# Patient Record
Sex: MALE | Race: WHITE | NOT HISPANIC OR LATINO
[De-identification: names, ages, dates, MRNs, and addresses within clinical notes are randomized per-mention and may not be internally consistent; named-entity substitution may affect disease eponyms.]

---

## 2017-01-01 ENCOUNTER — RECORDS - HEALTHEAST (OUTPATIENT)
Dept: ADMINISTRATIVE | Facility: OTHER | Age: 67
End: 2017-01-01

## 2017-03-09 ENCOUNTER — RECORDS - HEALTHEAST (OUTPATIENT)
Dept: ADMINISTRATIVE | Facility: OTHER | Age: 67
End: 2017-03-09

## 2017-07-19 ENCOUNTER — RECORDS - HEALTHEAST (OUTPATIENT)
Dept: ADMINISTRATIVE | Facility: OTHER | Age: 67
End: 2017-07-19

## 2018-01-01 ENCOUNTER — RECORDS - HEALTHEAST (OUTPATIENT)
Dept: ADMINISTRATIVE | Facility: OTHER | Age: 68
End: 2018-01-01

## 2018-01-01 ENCOUNTER — HOSPITAL ENCOUNTER (INPATIENT)
Dept: MEDSURG UNIT | Facility: CLINIC | Age: 68
End: 2018-08-07
Attending: INTERNAL MEDICINE | Admitting: INTERNAL MEDICINE

## 2018-01-01 LAB
AEROBIC BLOOD CULTURE BOTTLE: ABNORMAL
ALBUMIN SERPL-MCNC: 1.3 G/DL (ref 3.5–5)
ALBUMIN SERPL-MCNC: 1.4 G/DL (ref 3.5–5)
ALBUMIN UR-MCNC: ABNORMAL MG/DL
ALP SERPL-CCNC: 231 U/L (ref 45–120)
ALP SERPL-CCNC: 381 U/L (ref 45–120)
ALT SERPL W P-5'-P-CCNC: 36 U/L (ref 0–45)
ALT SERPL W P-5'-P-CCNC: 50 U/L (ref 0–45)
AMMONIA PLAS-SCNC: 60 UMOL/L (ref 11–35)
ANAEROBIC BLOOD CULTURE BOTTLE: ABNORMAL
ANION GAP SERPL CALCULATED.3IONS-SCNC: 24 MMOL/L (ref 5–18)
ANION GAP SERPL CALCULATED.3IONS-SCNC: 25 MMOL/L (ref 5–18)
ANION GAP SERPL CALCULATED.3IONS-SCNC: >31 MMOL/L (ref 5–18)
APPEARANCE UR: ABNORMAL
AST SERPL W P-5'-P-CCNC: 33 U/L (ref 0–40)
AST SERPL W P-5'-P-CCNC: 42 U/L (ref 0–40)
ATRIAL RATE - MUSE: 74 BPM
BACTERIA #/AREA URNS HPF: ABNORMAL HPF
BASE EXCESS BLDA CALC-SCNC: -20.3 MMOL/L
BASE EXCESS BLDA CALC-SCNC: -24.2 MMOL/L
BASOPHILS # BLD AUTO: 0 THOU/UL (ref 0–0.2)
BASOPHILS # BLD AUTO: 0 THOU/UL (ref 0–0.2)
BASOPHILS NFR BLD AUTO: 0 % (ref 0–2)
BASOPHILS NFR BLD AUTO: 0 % (ref 0–2)
BILIRUB DIRECT SERPL-MCNC: 0.1 MG/DL
BILIRUB DIRECT SERPL-MCNC: 0.2 MG/DL
BILIRUB SERPL-MCNC: 0.3 MG/DL (ref 0–1)
BILIRUB SERPL-MCNC: 0.3 MG/DL (ref 0–1)
BILIRUB UR QL STRIP: NEGATIVE
BUN SERPL-MCNC: 102 MG/DL (ref 8–22)
BUN SERPL-MCNC: 97 MG/DL (ref 8–22)
BUN SERPL-MCNC: 99 MG/DL (ref 8–22)
CALCIUM SERPL-MCNC: 7.9 MG/DL (ref 8.5–10.5)
CALCIUM SERPL-MCNC: 8.2 MG/DL (ref 8.5–10.5)
CALCIUM SERPL-MCNC: 9.7 MG/DL (ref 8.5–10.5)
CHLORIDE BLD-SCNC: 92 MMOL/L (ref 98–107)
CHLORIDE BLD-SCNC: 97 MMOL/L (ref 98–107)
CHLORIDE BLD-SCNC: 98 MMOL/L (ref 98–107)
CK SERPL-CCNC: 47 U/L (ref 30–190)
CO2 SERPL-SCNC: 7 MMOL/L (ref 22–31)
CO2 SERPL-SCNC: 8 MMOL/L (ref 22–31)
CO2 SERPL-SCNC: <6 MMOL/L (ref 22–31)
COHGB MFR BLD: 100 % (ref 95–96)
COHGB MFR BLD: 93.5 % (ref 95–96)
COLOR UR AUTO: YELLOW
CREAT SERPL-MCNC: 3.81 MG/DL (ref 0.7–1.3)
CREAT SERPL-MCNC: 3.85 MG/DL (ref 0.7–1.3)
CREAT SERPL-MCNC: 3.9 MG/DL (ref 0.7–1.3)
DIASTOLIC BLOOD PRESSURE - MUSE: NORMAL MMHG
EOSINOPHIL COUNT (ABSOLUTE): 0 THOU/UL (ref 0–0.4)
EOSINOPHIL COUNT (ABSOLUTE): 0.4 THOU/UL (ref 0–0.4)
EOSINOPHIL NFR BLD AUTO: 0 % (ref 0–6)
EOSINOPHIL NFR BLD AUTO: 1 % (ref 0–6)
ERYTHROCYTE [DISTWIDTH] IN BLOOD BY AUTOMATED COUNT: 18.5 % (ref 11–14.5)
ERYTHROCYTE [DISTWIDTH] IN BLOOD BY AUTOMATED COUNT: 18.5 % (ref 11–14.5)
ERYTHROCYTE [DISTWIDTH] IN BLOOD BY AUTOMATED COUNT: 18.6 % (ref 11–14.5)
FLOW: 3.5 LPM
FLOW: 3.5 LPM
GFR SERPL CREATININE-BSD FRML MDRD: 16 ML/MIN/1.73M2
GLUCOSE BLD-MCNC: 128 MG/DL (ref 70–125)
GLUCOSE BLD-MCNC: 159 MG/DL (ref 70–125)
GLUCOSE BLD-MCNC: 194 MG/DL (ref 70–125)
GLUCOSE BLDC GLUCOMTR-MCNC: 122 MG/DL
GLUCOSE BLDC GLUCOMTR-MCNC: 173 MG/DL
GLUCOSE BLDC GLUCOMTR-MCNC: 197 MG/DL
GLUCOSE UR STRIP-MCNC: NEGATIVE MG/DL
HCO3, ARTERIAL CALC - HISTORICAL: 6.1 MMOL/L (ref 23–29)
HCO3, ARTERIAL CALC - HISTORICAL: 9 MMOL/L (ref 23–29)
HCT VFR BLD AUTO: 31.2 % (ref 40–54)
HCT VFR BLD AUTO: 36.4 % (ref 40–54)
HCT VFR BLD AUTO: 36.4 % (ref 40–54)
HGB BLD-MCNC: 10.6 G/DL (ref 14–18)
HGB BLD-MCNC: 11.9 G/DL (ref 14–18)
HGB BLD-MCNC: 11.9 G/DL (ref 14–18)
HGB UR QL STRIP: ABNORMAL
INR PPP: 2.43 (ref 0.9–1.1)
INR PPP: 2.58 (ref 0.9–1.1)
INTERPRETATION ECG - MUSE: NORMAL
KETONES UR STRIP-MCNC: NEGATIVE MG/DL
LACTATE SERPL-SCNC: 10.2 MMOL/L (ref 0.5–2.2)
LACTATE SERPL-SCNC: 15.2 MMOL/L (ref 0.5–2.2)
LACTATE SERPL-SCNC: 8.1 MMOL/L (ref 0.5–2.2)
LACTATE SERPL-SCNC: 8.3 MMOL/L (ref 0.5–2.2)
LEUKOCYTE ESTERASE UR QL STRIP: ABNORMAL
LIPASE SERPL-CCNC: 19 U/L (ref 0–52)
LYMPHOCYTES # BLD AUTO: 0.8 THOU/UL (ref 0.8–4.4)
LYMPHOCYTES # BLD AUTO: 1.7 THOU/UL (ref 0.8–4.4)
LYMPHOCYTES NFR BLD AUTO: 3 % (ref 20–40)
LYMPHOCYTES NFR BLD AUTO: 4 % (ref 20–40)
MAGNESIUM SERPL-MCNC: 2.8 MG/DL (ref 1.8–2.6)
MCH RBC QN AUTO: 30.7 PG (ref 27–34)
MCH RBC QN AUTO: 30.7 PG (ref 27–34)
MCH RBC QN AUTO: 31 PG (ref 27–34)
MCHC RBC AUTO-ENTMCNC: 32.7 G/DL (ref 32–36)
MCHC RBC AUTO-ENTMCNC: 32.7 G/DL (ref 32–36)
MCHC RBC AUTO-ENTMCNC: 34 G/DL (ref 32–36)
MCV RBC AUTO: 91 FL (ref 80–100)
MCV RBC AUTO: 94 FL (ref 80–100)
MCV RBC AUTO: 94 FL (ref 80–100)
MONOCYTES # BLD AUTO: 0.8 THOU/UL (ref 0–0.9)
MONOCYTES # BLD AUTO: 0.9 THOU/UL (ref 0–0.9)
MONOCYTES NFR BLD AUTO: 2 % (ref 2–10)
MONOCYTES NFR BLD AUTO: 3 % (ref 2–10)
MYELOCYTES (ABSOLUTE): 0.3 THOU/UL
MYELOCYTES (ABSOLUTE): 0.9 THOU/UL
MYELOCYTES NFR BLD MANUAL: 1 %
MYELOCYTES NFR BLD MANUAL: 2 %
NITRATE UR QL: NEGATIVE
OXYHEMOGLOBIN - HISTORICAL: 90.6 % (ref 95–96)
OXYHEMOGLOBIN - HISTORICAL: 96.7 % (ref 95–96)
P AXIS - MUSE: 64 DEGREES
PCO2 BLD: 24 MM HG (ref 35–45)
PCO2 BLD: <19 MM HG (ref 35–45)
PH BLD: 7.03 [PH] (ref 7.37–7.44)
PH BLD: 7.11 [PH] (ref 7.37–7.44)
PH UR STRIP: 6 [PH] (ref 4.5–8)
PLAT MORPH BLD: NORMAL
PLAT MORPH BLD: NORMAL
PLATELET # BLD AUTO: 223 THOU/UL (ref 140–440)
PLATELET # BLD AUTO: 312 THOU/UL (ref 140–440)
PLATELET # BLD AUTO: 312 THOU/UL (ref 140–440)
PMV BLD AUTO: 11.4 FL (ref 8.5–12.5)
PMV BLD AUTO: 11.4 FL (ref 8.5–12.5)
PMV BLD AUTO: 11.5 FL (ref 8.5–12.5)
PO2 BLD: 123 MM HG (ref 75–85)
PO2 BLD: 61 MM HG (ref 75–85)
POTASSIUM BLD-SCNC: 5.5 MMOL/L (ref 3.5–5)
POTASSIUM BLD-SCNC: 5.6 MMOL/L (ref 3.5–5)
POTASSIUM BLD-SCNC: 5.6 MMOL/L (ref 3.5–5)
PR INTERVAL - MUSE: 196 MS
PROT SERPL-MCNC: 5.3 G/DL (ref 6–8)
PROT SERPL-MCNC: 6.4 G/DL (ref 6–8)
QRS DURATION - MUSE: 102 MS
QT - MUSE: 378 MS
QTC - MUSE: 419 MS
R AXIS - MUSE: 57 DEGREES
RBC # BLD AUTO: 3.42 MILL/UL (ref 4.4–6.2)
RBC # BLD AUTO: 3.87 MILL/UL (ref 4.4–6.2)
RBC # BLD AUTO: 3.87 MILL/UL (ref 4.4–6.2)
RBC #/AREA URNS AUTO: ABNORMAL HPF
SODIUM SERPL-SCNC: 129 MMOL/L (ref 136–145)
SODIUM SERPL-SCNC: 129 MMOL/L (ref 136–145)
SODIUM SERPL-SCNC: 130 MMOL/L (ref 136–145)
SP GR UR STRIP: 1.02 (ref 1–1.03)
SQUAMOUS #/AREA URNS AUTO: ABNORMAL LPF
SYSTOLIC BLOOD PRESSURE - MUSE: NORMAL MMHG
T AXIS - MUSE: 77 DEGREES
TEMPERATURE: 37 DEGREES C
TEMPERATURE: 37 DEGREES C
TOTAL NEUTROPHILS-ABS(DIFF): 25.4 THOU/UL (ref 2–7.7)
TOTAL NEUTROPHILS-ABS(DIFF): 39.1 THOU/UL (ref 2–7.7)
TOTAL NEUTROPHILS-REL(DIFF): 91 % (ref 50–70)
TOTAL NEUTROPHILS-REL(DIFF): 93 % (ref 50–70)
TOXIC GRANULATION: ABNORMAL
TROPONIN I SERPL-MCNC: 0.02 NG/ML (ref 0–0.29)
UFH PPP CHRO-ACNC: <0.1 IU/ML (ref 0.3–0.7)
UFH PPP CHRO-ACNC: <0.1 IU/ML (ref 0.3–0.7)
UROBILINOGEN UR STRIP-ACNC: ABNORMAL
VENTILATION MODE: ABNORMAL
VENTILATION MODE: ABNORMAL
VENTRICULAR RATE- MUSE: 74 BPM
WBC #/AREA URNS AUTO: >100 HPF
WBC CLUMPS #/AREA URNS HPF: PRESENT /[HPF]
WBC: 27.3 THOU/UL (ref 4–11)
WBC: 43 THOU/UL (ref 4–11)
WBC: 43 THOU/UL (ref 4–11)

## 2018-01-01 RX ORDER — FERROUS SULFATE 325(65) MG
1 TABLET ORAL
Status: SHIPPED | COMMUNITY
Start: 2018-01-01

## 2018-01-01 RX ORDER — AMLODIPINE BESYLATE 10 MG/1
10 TABLET ORAL DAILY
Status: SHIPPED | COMMUNITY
Start: 2018-01-01

## 2018-01-01 RX ORDER — VENLAFAXINE HYDROCHLORIDE 75 MG/1
75 CAPSULE, EXTENDED RELEASE ORAL DAILY
Status: SHIPPED | COMMUNITY
Start: 2018-01-01

## 2018-01-01 ASSESSMENT — MIFFLIN-ST. JEOR
SCORE: 1572.31
SCORE: 1617.67

## 2018-08-08 LAB
AEROBIC BLOOD CULTURE BOTTLE: ABNORMAL
ANAEROBIC BLOOD CULTURE BOTTLE: ABNORMAL
BACTERIA SPEC CULT: NORMAL

## 2018-08-10 LAB
BACTERIA SPEC CULT: ABNORMAL
GRAM STAIN RESULT: ABNORMAL

## 2018-08-30 ENCOUNTER — RECORDS - HEALTHEAST (OUTPATIENT)
Dept: ADMINISTRATIVE | Facility: OTHER | Age: 68
End: 2018-08-30

## 2021-06-01 VITALS — BODY MASS INDEX: 32.24 KG/M2 | HEIGHT: 66 IN | WEIGHT: 200.6 LBS

## 2021-06-16 PROBLEM — R65.21 SEPTIC SHOCK (H): Status: ACTIVE | Noted: 2018-01-01

## 2021-06-16 PROBLEM — G93.41 ACUTE METABOLIC ENCEPHALOPATHY: Status: ACTIVE | Noted: 2018-01-01

## 2021-06-16 PROBLEM — A41.9 SEPTIC SHOCK (H): Status: ACTIVE | Noted: 2018-01-01

## 2021-06-16 PROBLEM — N17.9 ACUTE RENAL FAILURE (ARF) (H): Status: ACTIVE | Noted: 2018-01-01

## 2021-06-16 PROBLEM — E87.29 HIGH ANION GAP METABOLIC ACIDOSIS: Status: ACTIVE | Noted: 2018-01-01

## 2021-06-16 PROBLEM — E87.20 LACTIC ACIDOSIS: Status: ACTIVE | Noted: 2018-01-01

## 2021-06-16 PROBLEM — A41.9 SEPSIS (H): Status: ACTIVE | Noted: 2018-01-01

## 2021-06-16 PROBLEM — R78.81 BACTEREMIA: Status: ACTIVE | Noted: 2018-01-01

## 2021-06-19 NOTE — PROGRESS NOTES
Palliative Care    Patient placed on comfort care prior to our consult.  Discussed with Dr. Arzola and Dr. Delacruz.  I added comfort care meds.  Discussed with Dr. Arzola, patient was diverted to [facility].  Wife will not be coming in.  Patient is anticipated to die here. Palliative Care to follow and if patient plateaus, then I will refer to Hospice.  Discussed with Bianca Claudio, RNCM, per Dr Arzola, wife is concerned about Cremation cost or any costs that my occur that would not have occurred at the [facility].  Bianca to call wife.     Full now to follow    Salud Looney  Palliative Care

## 2021-06-19 NOTE — PROGRESS NOTES
"Spiritual Care Note    Spiritual Assessment:  referred to patient via spiritual care consult; patient now on comfort care. Patient resting when  arrives. Patient seems to be struggling when  arrives; no family is present. When asked about what brought patient into the hospital he states, \"I got lost.\" When asked about support at this time patient simply continued to repeat and address.  unsure who's address patient was quoting and  did tell . Patient states appreciation for visit and did say goodbye when the  left.  hopeful patient does have family for support.     Care Provided:   offered words of comfort and support.     Plan of Care: Spiritual care will continue to follow as part of patient's care team.     AGUSTÍN Huerta, BCC    "

## 2021-06-19 NOTE — DISCHARGE SUMMARY
[facility] MEDICINE  DISCHARGE SUMMARY     Primary Care Physician: No Primary Care Provider  Admission Date: 2018   Discharge Provider: Josefa Arzola Discharge Date: 2018             Condition at Discharge:       REASON FOR PRESENTATION(See Admission Note for Details)     Altered mental status    PRINCIPAL & ACTIVE DISCHARGE DIAGNOSES     Principal Problem:    Septic shock (H)  Active Problems:    Acute renal failure (ARF) (H)    Lactic acidosis    High anion gap metabolic acidosis    Acute metabolic encephalopathy    Nephrostomy tube displaced (H)    Counseling regarding end of life decision making    Anal squamous cell carcinoma (H)    Bacteremia      SIGNIFICANT FINDINGS (Imaging, labs):     Ct Abdomen Pelvis Without Oral Without Iv Contrast    Result Date: 2018  CT ABDOMEN PELVIS WO ORAL WO IV CONTRAST 2018 5:22 PM INDICATION: Pain/bloating abdominal pain, left nephrostomy tubes TECHNIQUE: Routine CT abdomen and pelvis without oral or IV contrast. Multiplanar reformation images (MPR). Dose reduction techniques were used. COMPARISON: None. FINDINGS: LUNG BASES: Calcified pleural thickening right base. ABDOMEN: Mild right hydronephrosis with small amount of air in the collecting system and mild dilatation of the right ureter to the bladder. Right nephrostomy tube is located outside of the collecting system with tip in the posterior cortex. Moderate left hydronephrosis with the left ureter dilated to the bladder. Left nephrostomy tube tip is located in the subcutaneous fat left flank region. No calculi. The liver, spleen, pancreas, adrenal glands, and the gallbladder are unremarkable. There is questionable gas/pneumatosis within the anterior gastric wall. The rest of the stomach is distended with fluid. No evidence for pneumoperitoneum. Numerous enlarged retroperitoneal lymph nodes, largest measuring 3.4 x 2.5 cm. Smaller enlarged retrocrural nodes. PELVIS: Mildly distended bladder  with mild diffuse wall thickening. Bilateral pelvic sidewall adenopathy, the largest node measuring 4.2 x 2.1 cm. There are also several enlarged bilateral inguinal lymph nodes. No evidence for bowel obstruction. Prostatic hypertrophy. Scattered diverticula left colon. MUSCULOSKELETAL: Degenerative disc disease thoracolumbar spine.     CONCLUSION: 1.  Mild to moderate bilateral hydronephrosis. Both nephrostomy tubes are located external to the collecting systems and should be removed or repositioned. 2.  Dilatation of the ureters to the bladder where there is mild diffuse wall thickening. Study limited due to the lack of excreted contrast in the collecting systems and bladder. Urothelial tumor not excluded. 3.  Retroperitoneal and bilateral pelvic sidewall adenopathy concerning for lymphoma. Clinical correlation recommended. 4.  Questionable pneumatosis anterior gastric wall. Further characterization with repeat study after oral and IV contrast recommended.    Xr Chest 1 View Portable    Result Date: 8/6/2018  XR CHEST 1 VIEW PORTABLE 8/6/2018 6:30 PM INDICATION: Confusion COMPARISON: None. FINDINGS: Heart size at the upper limits of normal for technique. Lungs appear clear. No pleural effusion or pneumothorax.    Ct Head Without Contrast    Result Date: 8/6/2018  [facility] CT HEAD WO CONTRAST 8/6/2018 5:27 PM INDICATION: Confusion, fall TECHNIQUE: Without IV contrast. Dose reduction techniques were used. CONTRAST: None COMPARISON:  None FINDINGS: No intracranial hemorrhage, extraaxial collection, mass effect or CT evidence of acute infarct.  Nonspecific punctate and confluent areas of decreased attenuation in the periventricular and subcortical white matter, presumed sequelae of moderate chronic small vessel ischemic disease. Moderate cerebral parenchymal volume loss with concordant ex vacuo dilatation of the ventricular system. Prominent atherosclerotic calcifications in the cavernous internal carotid  arteries. Osseous structures are intact. The visualized orbits, paranasal sinuses and mastoid air cells are free of significant disease.     CONCLUSION: 1.  No evidence of acute intracranial hemorrhage, mass effect, or acute infarction by CT. 2.  Moderate cerebral parenchymal volume loss with presumed sequelae of moderate chronic small vessel ischemic disease.    PENDING LABS      Order Current Status    Blood Culture 2nd In process    Culture, Urine In process    Culture, Blood Positive Work-up Preliminary result          PROCEDURES ( this hospitalization only)      * No surgery found *      SUMMARY OF HOSPITAL COURSE:      68M with hx of prostate CA sp XRT, stage IV anal cancer not a candidate for further XRT, obstructive uropathy s/p nephrostomy placement, DVT/PE.  Mr. Ceja who presents with septic shock due to gram negative and gram positive bacteremia likely from pyelonephritis, malpositioned nephrostomy tubes, and acute renal failure with profound metabolic derangement including severe metabolic acidosis.  Mr. Ceja has persistent severe lactic acidosis and hypotension despite fluid resuscitation and initial broad spectrum antibiotics.  After discussion with the ICU team Mrs. Rodríguez has elected to pursue a comfort oriented approach to care, which I have confirmed as well.  Mr. Ceja  shortly thereafter on 18 @ 18:32.    Cause of death septic shock due to bacteremia.  Significant contributing factors metastatic anal cancer, renal failure.      Consults   pulmonary/intensive care and palliative care, surgery    Josefa Arzola MD   [facility] Hospitalist Service: Ph:[phone]    CC:No Primary Care Provider

## 2021-06-19 NOTE — PROGRESS NOTES
Pt was placed for brief time on BiPAP to reduce work of breathing this morning. Pt removed mask after a few mint and declined, placed back on 6L NC, BiPAP is pulled.      Jossue Agustin, LRT

## 2021-06-19 NOTE — PROGRESS NOTES
Hospitalist Progress Note    Overnight Events/Subjective:    Complains of pain all over especially the back and requesting pain medication.  Confused and poor short term memory - doesn't recall conversation we had 10 minutes prior regarding his medical condition.  Thinks his nurse is trying to kill his wife.    Discussed with ICU team and confirmed with Mrs. Ceja over the phone that she would like a comfort only approach to care.      Assessment/Plan    68M with hx of prostate CA sp XRT, stage IV anal cancer not a candidate for further XRT, obstructive uropathy s/p nephrostomy placement, DVT/PE.  Mr. Ceja who presents with septic shock due to gram negative and gram positive bacteremia likely from pyelonephritis, malpositioned nephrostomy tubes, and acute renal failure with profound metabolic derangement including severe metabolic acidosis.  Mr. Ceja has persistent severe lactic acidosis and hypotension despite fluid resuscitation and initial broad spectrum antibiotics.  After discussion with the ICU team Mrs. Rodríguez has elected to pursue a comfort oriented approach to care, which I have confirmed as well.    Orders changed for comfort.    Principal Problem:    Septic shock (H)  Active Problems:    Acute renal failure (ARF) (H)    Lactic acidosis    High anion gap metabolic acidosis    Acute metabolic encephalopathy    Nephrostomy tube displaced (H)    Counseling regarding end of life decision making    Anal squamous cell carcinoma (H)    Bacteremia      Objective    Vital signs in last 24 hours  Temp:  [94.3  F (34.6  C)-97  F (36.1  C)] 95.2  F (35.1  C)  Heart Rate:  [69-81] 81  Resp:  [17-60] 37  BP: ()/(35-56) 86/49  FiO2 (%):  [35 %] 35 % 92% O2 Device: Bi-Pap O2 Flow Rate (L/min): 3.5 L/min  Weight:   200 lb 9.6 oz (91 kg) Weight change:     Intake/Output last 3 shifts  I/O last 3 completed shifts:  In: 3000 [I.V.:3000]  Out: 0   Body mass index is 32.38 kg/(m^2).    Physical Exam  General:  Awake,  interactive, confused - thinks nurse is trying to kill his wife.  Ill appearing  Neurologic:  Grossly non focal, psychomotor slowing  HEENT:  Dry MM  CV: RRR no MRG, normal S1 and S2, no edema  Lungs: mildly dyspneic, tachypnic with deep breaths likely due to profound acidosis, coarse  Abd: softly distended, mildly TTP, normoactive BS,  Skin: pale  Central Lines and Tubes: None (no monroy, CVC, or PICC)       Labs: Labs reviewed and pertinent findings discussed in A/P  Medications: medication list reviewed.  Pertinent changes discussed in A/P    Discussed disposition planning with palliative care, ICU, wife.    Josefa Arzola MD  Internal Medicine Hospitalist  8/7/2018 12:26 PM

## 2021-06-19 NOTE — PROGRESS NOTES
Patient is refusing the use of the bare hugger or any warm blankets.  It has been explained to him that his body temperature is to low and that the use of a bare hugger or warm blankets would be beneficial and he still refuses.  He is also refusing the placement of a nasal gastric tube.  He has been informed that it is ordered by the physician and he still refuses placement. The intensivist has been made aware of the patients refusals.

## 2021-06-19 NOTE — ED PROVIDER NOTES
"eMERGENCY dEPARTMENT eNCOUnter        CHIEF COMPLAINT    Chief Complaint   Patient presents with     Fatigue     Altered Mental Status       HPI    Donavan Ceja is a 68 y.o. male with bilateral nephrostomy tubes who presents to this ED for evaluation of altered mental status. The patient was a limited historian due to altered mental status. He arrives via EMS from home, where he resides with his wife. According to the medics, the patient slid out of bed. Wife reports that he is barely talking and has had an increase in weakness and confusion. En route, patient's oxygen saturation was 86% on room air; he was placed on 2L NC with improvement to 94%. Blood sugar was 186. Upon evaluation in the ED, patient is found covered in feces. The patient states that he \"hurts all over\".  He does report his nephrostomy tube has been in place \"for a long time\".  No further history able to be obtained at this time.    This document serves as a record of services performed by Dr. Freddy Stewart MD,  it was created on his behalf by Johnnie Gross, a trained medical scribe. The creation of this record is based on the scribes personal observations and the providers statements to her. This document has been checked and approved by the attending provider.    PAST MEDICAL HISTORY    No past medical history on file.    SURGICAL HISTORY    No past surgical history on file.    CURRENT MEDICATIONS    No current facility-administered medications for this encounter.   No current outpatient prescriptions on file.    ALLERGIES    Allergies not on file    FAMILY HISTORY    No family history on file.    SOCIAL HISTORY    Social History     Social History     Marital status: N/A     Spouse name: N/A     Number of children: N/A     Years of education: N/A     Social History Main Topics     Smoking status: Not on file     Smokeless tobacco: Not on file     Alcohol use Not on file     Drug use: Not on file     Sexual activity: Not on file     Other Topics " Concern     Not on file     Social History Narrative       REVIEW OF SYSTEMS    A 10 point ROS was performed and all pertinent positive and negative findings are in the HPI.  All others negative.      PHYSICAL EXAM    VITAL SIGNS: Pulse 80  Temp (!) 95.8  F (35.4  C) (Temporal)   Resp (!) 46  Wt 186 lb (84.4 kg)  SpO2 93%   GENERAL: Awake and confused.  In moderate distress.   HEENT: Normocephalic, atraumatic.  Pupils equal, round and reactive.  Conjunctiva normal.  EOMI. Tacky mucous membranes. Fissuring of the tongue.  NECK: No stridor.  PULMONARY: Moderate tachypnea. Lungs clear to auscultation bilaterally without wheezes, rhonchi or rales.  CARDIO: Regular rate and rhythm.  No significant murmur, rub or gallop.  Radial pulses strong and symmetrical.  ABDOMINAL: Abdomen soft, non-distended. Diffusely tender to palpation. Hyperactive bowel sounds. No CVAT, BL. Left nephrostomy tube displaced approximately 5 cm. Right nephrostomy tube appears to be in good position.  EXTREMITIES: No lower extremity swelling or edema.    NEURO: Awake and confused. Gives simple 1-2 word responses. Cranial nerves grossly intact.  No focal motor deficit.  PSYCH: Confused.   SKIN: No rashes. Cool to touch.  Few areas of bruising noted to bilateral forearms.     LABS  Results for orders placed or performed during the hospital encounter of 08/06/18   Ammonia   Result Value Ref Range    Ammonia 60 (H) 11 - 35 umol/L   Basic Metabolic Panel   Result Value Ref Range    Sodium 129 (L) 136 - 145 mmol/L    Potassium 5.6 (H) 3.5 - 5.0 mmol/L    Chloride 92 (L) 98 - 107 mmol/L    CO2 <6 (LL) 22 - 31 mmol/L    Anion Gap, Calculation >31 (H) 5 - 18 mmol/L    Glucose 159 (H) 70 - 125 mg/dL    Calcium 9.7 8.5 - 10.5 mg/dL    BUN 97 (H) 8 - 22 mg/dL    Creatinine 3.81 (H) 0.70 - 1.30 mg/dL    GFR MDRD Af Amer 19 (L) >60 mL/min/1.73m2    GFR MDRD Non Af Amer 16 (L) >60 mL/min/1.73m2   Hepatic Profile   Result Value Ref Range    Bilirubin, Total 0.3  0.0 - 1.0 mg/dL    Bilirubin, Direct 0.2 <=0.5 mg/dL    Protein, Total 6.4 6.0 - 8.0 g/dL    Albumin 1.4 (L) 3.5 - 5.0 g/dL    Alkaline Phosphatase 381 (H) 45 - 120 U/L    AST 33 0 - 40 U/L    ALT 50 (H) 0 - 45 U/L   Lactic Acid   Result Value Ref Range    Lactic Acid 15.2 (HH) 0.5 - 2.2 mmol/L   Lipase   Result Value Ref Range    Lipase 19 0 - 52 U/L   HM2 (CBC W/O DIFF)   Result Value Ref Range    WBC 43.0 (HH) 4.0 - 11.0 thou/uL    RBC 3.87 (L) 4.40 - 6.20 mill/uL    Hemoglobin 11.9 (L) 14.0 - 18.0 g/dL    Hematocrit 36.4 (L) 40.0 - 54.0 %    MCV 94 80 - 100 fL    MCH 30.7 27.0 - 34.0 pg    MCHC 32.7 32.0 - 36.0 g/dL    RDW 18.5 (H) 11.0 - 14.5 %    Platelets 312 140 - 440 thou/uL    MPV 11.4 8.5 - 12.5 fL   CK   Result Value Ref Range    CK, Total 47 30 - 190 U/L   Magnesium   Result Value Ref Range    Magnesium 2.8 (H) 1.8 - 2.6 mg/dL   INR   Result Value Ref Range    INR 2.58 (H) 0.90 - 1.10   Troponin I   Result Value Ref Range    Troponin I 0.02 0.00 - 0.29 ng/mL   HM1 (CBC with Diff)   Result Value Ref Range    WBC 43.0 (HH) 4.0 - 11.0 thou/uL    RBC 3.87 (L) 4.40 - 6.20 mill/uL    Hemoglobin 11.9 (L) 14.0 - 18.0 g/dL    Hematocrit 36.4 (L) 40.0 - 54.0 %    MCV 94 80 - 100 fL    MCH 30.7 27.0 - 34.0 pg    MCHC 32.7 32.0 - 36.0 g/dL    RDW 18.5 (H) 11.0 - 14.5 %    Platelets 312 140 - 440 thou/uL    MPV 11.4 8.5 - 12.5 fL   Manual Differential   Result Value Ref Range    Total Neutrophils % 91 (H) 50 - 70 %    Lymphocytes % 4 (L) 20 - 40 %    Monocytes % 2 2 - 10 %    Eosinophils %  1 0 - 6 %    Basophils % 0 0 - 2 %    Myelocytes % 2 (H) <=1 %    Total Neutrophils Absolute 39.1 (H) 2.0 - 7.7 thou/ul    Lymphocytes Absolute 1.7 0.8 - 4.4 thou/uL    Monocytes Absolute 0.9 0.0 - 0.9 thou/uL    Eosinophils Absolute 0.4 0.0 - 0.4 thou/uL    Basophils Absolute 0.0 0.0 - 0.2 thou/uL    Myelocytes Absolute 0.9 (H) <=0.1 thou/uL    Platelet Estimate Normal Normal   ECG 12 lead nursing unit performed   Result Value  Ref Range    SYSTOLIC BLOOD PRESSURE  mmHg    DIASTOLIC BLOOD PRESSURE  mmHg    VENTRICULAR RATE 74 BPM    ATRIAL RATE 74 BPM    P-R INTERVAL 196 ms    QRS DURATION 102 ms    Q-T INTERVAL 378 ms    QTC CALCULATION (BEZET) 419 ms    P Axis 64 degrees    R AXIS 57 degrees    T AXIS 77 degrees    MUSE DIAGNOSIS       Normal sinus rhythm  Normal ECG  No previous ECGs available             EKG independent review and interpretation  Normal sinus rhythm.  Rate is 74.  Normal QRS.  Normal ST segments.  Normal EKG.    RADIOLOGY  No orders to display       Ct Abdomen Pelvis Without Oral Without Iv Contrast    Result Date: 8/6/2018  CT ABDOMEN PELVIS WO ORAL WO IV CONTRAST 8/6/2018 5:22 PM INDICATION: Pain/bloating abdominal pain, left nephrostomy tubes TECHNIQUE: Routine CT abdomen and pelvis without oral or IV contrast. Multiplanar reformation images (MPR). Dose reduction techniques were used. COMPARISON: None. FINDINGS: LUNG BASES: Calcified pleural thickening right base. ABDOMEN: Mild right hydronephrosis with small amount of air in the collecting system and mild dilatation of the right ureter to the bladder. Right nephrostomy tube is located outside of the collecting system with tip in the posterior cortex. Moderate left hydronephrosis with the left ureter dilated to the bladder. Left nephrostomy tube tip is located in the subcutaneous fat left flank region. No calculi. The liver, spleen, pancreas, adrenal glands, and the gallbladder are unremarkable. There is questionable gas/pneumatosis within the anterior gastric wall. The rest of the stomach is distended with fluid. No evidence for pneumoperitoneum. Numerous enlarged retroperitoneal lymph nodes, largest measuring 3.4 x 2.5 cm. Smaller enlarged retrocrural nodes. PELVIS: Mildly distended bladder with mild diffuse wall thickening. Bilateral pelvic sidewall adenopathy, the largest node measuring 4.2 x 2.1 cm. There are also several enlarged bilateral inguinal lymph  nodes. No evidence for bowel obstruction. Prostatic hypertrophy. Scattered diverticula left colon. MUSCULOSKELETAL: Degenerative disc disease thoracolumbar spine.     CONCLUSION: 1.  Mild to moderate bilateral hydronephrosis. Both nephrostomy tubes are located external to the collecting systems and should be removed or repositioned. 2.  Dilatation of the ureters to the bladder where there is mild diffuse wall thickening. Study limited due to the lack of excreted contrast in the collecting systems and bladder. Urothelial tumor not excluded. 3.  Retroperitoneal and bilateral pelvic sidewall adenopathy concerning for lymphoma. Clinical correlation recommended. 4.  Questionable pneumatosis anterior gastric wall. Further characterization with repeat study after oral and IV contrast recommended.    Xr Chest 1 View Portable    Result Date: 8/6/2018  XR CHEST 1 VIEW PORTABLE 8/6/2018 6:30 PM INDICATION: Confusion COMPARISON: None. FINDINGS: Heart size at the upper limits of normal for technique. Lungs appear clear. No pleural effusion or pneumothorax.    Ct Head Without Contrast    Result Date: 8/6/2018  [facility] CT HEAD WO CONTRAST 8/6/2018 5:27 PM INDICATION: Confusion, fall TECHNIQUE: Without IV contrast. Dose reduction techniques were used. CONTRAST: None COMPARISON:  None FINDINGS: No intracranial hemorrhage, extraaxial collection, mass effect or CT evidence of acute infarct.  Nonspecific punctate and confluent areas of decreased attenuation in the periventricular and subcortical white matter, presumed sequelae of moderate chronic small vessel ischemic disease. Moderate cerebral parenchymal volume loss with concordant ex vacuo dilatation of the ventricular system. Prominent atherosclerotic calcifications in the cavernous internal carotid arteries. Osseous structures are intact. The visualized orbits, paranasal sinuses and mastoid air cells are free of significant disease.     CONCLUSION: 1.  No evidence of  acute intracranial hemorrhage, mass effect, or acute infarction by CT. 2.  Moderate cerebral parenchymal volume loss with presumed sequelae of moderate chronic small vessel ischemic disease.      PROCEDURES        ED COURSE & MEDICAL DECISION MAKING    5:01 PM Patient interviewed and examined.  Patient presents for evaluation of confusion.  Only history known is of left-sided nephrostomy tubes.  These were open to air and patient was covered with feces on arrival.  Overwhelming concern is of infectious process.  However he does have marked fissuring of the tongue raising concerns of severe dehydration.  Pulses normal however blood pressure is unobtainable patient is cool to touch.  Laboratory evaluation ordered including cultures.  Fluid bolus initiated.  Patient remains afebrile with normal sinus rhythm making infectious process less likely.  5:42 PM Pressure obtained, 105/47.  Patient given a small dose of fentanyl for purposes of obtaining imaging.  Intravenous fluids been administered.  Initial blood pressure does not reveal significant hypotension.  5:55 PM.  White blood cell count reported as 45,000.  Zosyn initiated.  6:03 PM.  Nurse reports blood pressure is now 66 systolic.  An additional 1 L fluid bolus has been ordered.  Imaging will be delayed until blood pressure improves.  PICC line is been ordered.  2 peripheral IVs currently in place.  6:25 PM.  Lactic acid markedly elevated.  Patient also with acute renal failure.  This could explain much of his symptomatology.  Fluids are being continued.  Imaging is being obtained.  Records have been requested from [facility] where he typically obtains his care.  6:55 PM Paperwork from [facility] received. Patient has a history of rectal squamous cell carcinoma with mets. Bilateral nephrostomy tubes placed. Recent MRI on 7/16/18 without evidence for metastases. It also appears patient has had a recent PE.   7:15 PM Care discussed with Dr. Freeman,  intensivist.  Patient's blood pressure has stabilized at approximately 105 systolic.  He is slightly more alert but remains confused.  Review of records from [facility] reveals confusion is fairly common.  CT imaging reveals displaced nephrostomy tube on the left with functional nephrostomy tube on the right.  Also suggestion of abdominal gastric wall pneumatosis.  Given the complex medical problems a call will be placed to both urology and general surgery.  Moreover a call was placed to the [facility] for potential transfer.  I feel patient is stable for transfer at this point.  PICC line is being placed.  7:31 PM Spoke with Dr. Ospina, general surgery.  He will consult on patient should he remain here.  He is concerned about aggressive intervention with elevated INR and stage IV rectal cancer.  7:45 PM.  Spoke with Dr. Paredes of urology.  He recommends replacement of displaced left nephrostomy tube by interventional radiology.  A call is been placed to the [facility] for purposes of transfer.  Patient's blood pressure remained stable and acceptable.  8:23 PM Care discussed with [facility] for possible transfer.  Dr. Callahan informed of findings.  He is concerned of the patient's need for an intensive care unit bed which they do not have available.  Plan will be for hospitalization here.  A call will be placed interventional radiology for purposes of replacement of nephrostomy tube.  The intensivist will be recontacted.  8:37 PM Again discussed with Dr. Freeman, intensivist. Canelo lui hospitalist.  8:39 PM Spoke with Dr. Martin, interventional Radiology.  8:50 PM Spoke with Dr. Florentino, hospitalist who accepts patient for admission      Patient represents a critical care situation.  Approximately 90 minutes was spent directly involved in patient's care independent of any procedures per  FINAL IMPRESSION    Acute renal failure  Lactic acidosis  Leukocytosis  Transient  hypotension  Confusion  Left nephrostomy tube displacement  Stage IV rectal squamous carcinoma    I, Dr. Freddy Stewart MD, personally performed the services described in this documentation, as scribed by Johnnie Gross in my presence, and it is both accurate and complete.     Freddy Stewart MD  08/06/18 [id number]

## 2021-06-19 NOTE — PROGRESS NOTES
"Pharmacy Consult: Vancomycin Dosing    Pharmacist consulted to dose vancomycin for Donavan Ceja, a 68 y.o. male.    Ordering provider: Dr. Manjit Freeman, Critical Care    Indication for vancomycin therapy: empiric    Goal Trough Range:  10-20 mcg/mL based on indication    Other current antimicrobials             piperacillin-tazobactam 3.375 g in NaCl 0.9 % 50 mL (MINI-BAG Plus) (ZOSYN)  Every 12 hours          vancomycin 1.25 g in sodium chloride 0.9% 500 mL (VANCOCIN)  Once             Subjective/Objective:    Patient was admitted for Acute renal failure (ARF) (H) on 8/6/2018    Height: 5' 6\" (1.676 m)    Actual Body Weight (ABW): 86.5 kg (190 lb 9.6 oz)    Ideal body weight: 63.8 kg (140 lb 10.5 oz)  Adjusted ideal body weight: 72.9 kg (160 lb 10.1 oz)    BMI: Body mass index is 30.76 kg/(m^2).    No Known Allergies    Patient Active Problem List   Diagnosis     Acute renal failure (ARF) (H)     Sepsis (H)    No past medical history on file.     Temp Readings from Current Encounter:     08/06/18 1843 08/06/18 2016 08/06/18 2217   Temp: (!) 96.3  F (35.7  C) 96.9  F (36.1  C) (!) 95.2  F (35.1  C)     Net Intake/Output (last 24 hours):       Recent Labs      08/06/18   1725  08/06/18   2053   WBC  43.0*  43.0*   --    LACTICACID  15.2*  10.2*   BUN  97*   --    CREATININE  3.81*   --      Estimated Creatinine Clearance: 19.7 mL/min (by C-G formula based on Cr of 3.81).    No results for input(s): CULTURE in the last 72 hours.    No results found for any visits on 08/06/18.    No results for input(s): VANCOMYCIN in the last 168 hours.    Vancomycin administrations: (last 120 hours)     None          Assessment/Plan:    Pharmacist consulted to dose vancomycin  empirically, goal trough range 10-20 mcg/mL.  1. Initiate vancomycin intermittent dosing. First dose of Vancomycin 1250 mg IV tonight (14 mg/kg actual body weight).  2. No vancomycin level available for assessment.  3. Pharmacist will plan to check a " vancomycin trough level when clinically appropriate.  4. Pharmacist will continue to follow.    Thank you for the consult.  Sharonda Hassan PharmJAY 8/6/2018 10:50 PM

## 2021-06-19 NOTE — PROGRESS NOTES
Progress Note    Assessment/Plan  Principal Problem:    Acute renal failure (ARF) (H)  Active Problems:    Sepsis (H)    Lactic acidosis    High anion gap metabolic acidosis    Acute metabolic encephalopathy    Nephrostomy tube displaced (H)    This is a hospital day 1  He appears stable he is in bed still has some abdominal discomfort but he states is common normal forearm he has severe acidosis and problems related to his loss of his percutaneous nephrostomies.  He was in renal failure acidosis acute kidney injury along with metabolic encephalopathy.  His stage IV rectal cancer is not treatable secondary to gas prior radiation and issues and [facility] is [facility].  He is DNR now stated and at this time point he is coagulopathic but they would like to get his his nephrostomy tubes replaced.  If he is stable he may be will be [facility] today.  From a surgical standpoint I do not think he is an operation right now that he needs the questionable stomach issues I think her kind of a questionable myself on the CT scan but I do not see any issues at this time point and no acute processes of abdomen and forward we should put an NG in his and decompress his stomach at this time point he is doing fine improving his mental status is improved his sepsis is improved and I also believe he has a component of urosepsis going on most likely from the stent issues    Subjective  Awake, answers appropriately and recognizes me from last night  Objective    Vital signs in last 24 hours  Temp:  [94.3  F (34.6  C)-97  F (36.1  C)] 95.2  F (35.1  C)  Heart Rate:  [69-81] 81  Resp:  [17-60] 33  BP: ()/(35-56) 85/51  Weight:   200 lb 9.6 oz (91 kg)    Intake/Output last 3 shifts  I/O last 3 completed shifts:  In: 3000 [I.V.:3000]  Out: 0   Intake/Output this shift:       Review of Systems   Pertinent items are noted in HPI.    Physical Exam  ABD soft, some distension and tenderness, minimal urine output  Ext  warm    Pertinent Labs   Lab Results: personally reviewed.   Recent Results (from the past 24 hour(s))   Ammonia   Result Value Ref Range    Ammonia 60 (H) 11 - 35 umol/L   Basic Metabolic Panel   Result Value Ref Range    Sodium 129 (L) 136 - 145 mmol/L    Potassium 5.6 (H) 3.5 - 5.0 mmol/L    Chloride 92 (L) 98 - 107 mmol/L    CO2 <6 (LL) 22 - 31 mmol/L    Anion Gap, Calculation >31 (H) 5 - 18 mmol/L    Glucose 159 (H) 70 - 125 mg/dL    Calcium 9.7 8.5 - 10.5 mg/dL    BUN 97 (H) 8 - 22 mg/dL    Creatinine 3.81 (H) 0.70 - 1.30 mg/dL    GFR MDRD Af Amer 19 (L) >60 mL/min/1.73m2    GFR MDRD Non Af Amer 16 (L) >60 mL/min/1.73m2   Hepatic Profile   Result Value Ref Range    Bilirubin, Total 0.3 0.0 - 1.0 mg/dL    Bilirubin, Direct 0.2 <=0.5 mg/dL    Protein, Total 6.4 6.0 - 8.0 g/dL    Albumin 1.4 (L) 3.5 - 5.0 g/dL    Alkaline Phosphatase 381 (H) 45 - 120 U/L    AST 33 0 - 40 U/L    ALT 50 (H) 0 - 45 U/L   Lactic Acid   Result Value Ref Range    Lactic Acid 15.2 (HH) 0.5 - 2.2 mmol/L   Lipase   Result Value Ref Range    Lipase 19 0 - 52 U/L   HM2 (CBC W/O DIFF)   Result Value Ref Range    WBC 43.0 (HH) 4.0 - 11.0 thou/uL    RBC 3.87 (L) 4.40 - 6.20 mill/uL    Hemoglobin 11.9 (L) 14.0 - 18.0 g/dL    Hematocrit 36.4 (L) 40.0 - 54.0 %    MCV 94 80 - 100 fL    MCH 30.7 27.0 - 34.0 pg    MCHC 32.7 32.0 - 36.0 g/dL    RDW 18.5 (H) 11.0 - 14.5 %    Platelets 312 140 - 440 thou/uL    MPV 11.4 8.5 - 12.5 fL   CK   Result Value Ref Range    CK, Total 47 30 - 190 U/L   Magnesium   Result Value Ref Range    Magnesium 2.8 (H) 1.8 - 2.6 mg/dL   INR   Result Value Ref Range    INR 2.58 (H) 0.90 - 1.10   Troponin I   Result Value Ref Range    Troponin I 0.02 0.00 - 0.29 ng/mL   HM1 (CBC with Diff)   Result Value Ref Range    WBC 43.0 (HH) 4.0 - 11.0 thou/uL    RBC 3.87 (L) 4.40 - 6.20 mill/uL    Hemoglobin 11.9 (L) 14.0 - 18.0 g/dL    Hematocrit 36.4 (L) 40.0 - 54.0 %    MCV 94 80 - 100 fL    MCH 30.7 27.0 - 34.0 pg    MCHC 32.7 32.0  - 36.0 g/dL    RDW 18.5 (H) 11.0 - 14.5 %    Platelets 312 140 - 440 thou/uL    MPV 11.4 8.5 - 12.5 fL   Manual Differential   Result Value Ref Range    Total Neutrophils % 91 (H) 50 - 70 %    Lymphocytes % 4 (L) 20 - 40 %    Monocytes % 2 2 - 10 %    Eosinophils %  1 0 - 6 %    Basophils % 0 0 - 2 %    Myelocytes % 2 (H) <=1 %    Total Neutrophils Absolute 39.1 (H) 2.0 - 7.7 thou/ul    Lymphocytes Absolute 1.7 0.8 - 4.4 thou/uL    Monocytes Absolute 0.9 0.0 - 0.9 thou/uL    Eosinophils Absolute 0.4 0.0 - 0.4 thou/uL    Basophils Absolute 0.0 0.0 - 0.2 thou/uL    Myelocytes Absolute 0.9 (H) <=0.1 thou/uL    Platelet Estimate Normal Normal   ECG 12 lead nursing unit performed   Result Value Ref Range    SYSTOLIC BLOOD PRESSURE  mmHg    DIASTOLIC BLOOD PRESSURE  mmHg    VENTRICULAR RATE 74 BPM    ATRIAL RATE 74 BPM    P-R INTERVAL 196 ms    QRS DURATION 102 ms    Q-T INTERVAL 378 ms    QTC CALCULATION (BEZET) 419 ms    P Axis 64 degrees    R AXIS 57 degrees    T AXIS 77 degrees    MUSE DIAGNOSIS       Normal sinus rhythm  Normal ECG  No previous ECGs available     Lactic Acid   Result Value Ref Range    Lactic Acid 10.2 (HH) 0.5 - 2.2 mmol/L   Blood Gases, Arterial   Result Value Ref Range    pH, Arterial 7.03 (LL) 7.37 - 7.44    pCO2, Arterial <19 (LL) 35 - 45 mm Hg    pO2, Arterial 123 (H) 75 - 85 mm Hg    Bicarbonate, Arterial Calc 6.1 (L) 23.0 - 29.0 mmol/L    O2 Sat, Arterial 100.0 (H) 95.0 - 96.0 %    Oxyhemoglobin 96.7 (H) 95.0 - 96.0 %    Base Excess, Arterial Calc -24.2 mmol/L    Ventilation Mode Nasal cannula     Flow 3.5 LPM    Sample Stabilized Temperature 37.0 degrees C   POCT Glucose   Result Value Ref Range    Glucose,  mg/dL   Urinalysis-UC if Indicated   Result Value Ref Range    Color, UA Yellow Colorless, Yellow, Straw, Light Yellow    Clarity, UA Turbid (!) Clear    Glucose, UA Negative Negative    Bilirubin, UA Negative Negative    Ketones, UA Negative Negative    Specific Gravity, UA 1.020  1.001 - 1.030    Blood, UA Large (!) Negative    pH, UA 6.0 4.5 - 8.0    Protein,  mg/dL (!) Negative mg/dL    Urobilinogen, UA <2.0 E.U./dL <2.0 E.U./dL, 2.0 E.U./dL    Nitrite, UA Negative Negative    Leukocytes, UA Large (!) Negative    Bacteria, UA Many (!) None Seen hpf    RBC, UA 10-25 (!) None Seen, 0-2 hpf    WBC, UA >100 (!) None Seen, 0-5 hpf    Squam Epithel, UA 0-5 None Seen, 0-5 lpf    WBC Clumps Present (!) None Seen   Basic Metabolic Panel   Result Value Ref Range    Sodium 129 (L) 136 - 145 mmol/L    Potassium 5.5 (H) 3.5 - 5.0 mmol/L    Chloride 97 (L) 98 - 107 mmol/L    CO2 7 (LL) 22 - 31 mmol/L    Anion Gap, Calculation 25 (H) 5 - 18 mmol/L    Glucose 194 (H) 70 - 125 mg/dL    Calcium 8.2 (L) 8.5 - 10.5 mg/dL    BUN 99 (H) 8 - 22 mg/dL    Creatinine 3.90 (H) 0.70 - 1.30 mg/dL    GFR MDRD Af Amer 19 (L) >60 mL/min/1.73m2    GFR MDRD Non Af Amer 16 (L) >60 mL/min/1.73m2   Lactic Acid   Result Value Ref Range    Lactic Acid 8.3 (HH) 0.5 - 2.2 mmol/L   Anti-Xa Heparin Level   Result Value Ref Range    Anti-Xa Heparin Assay <0.10 (L) 0.30 - 0.70 IU/mL   POCT Glucose   Result Value Ref Range    Glucose,  mg/dL   Basic Metabolic Panel   Result Value Ref Range    Sodium 130 (L) 136 - 145 mmol/L    Potassium 5.6 (H) 3.5 - 5.0 mmol/L    Chloride 98 98 - 107 mmol/L    CO2 8 (LL) 22 - 31 mmol/L    Anion Gap, Calculation 24 (H) 5 - 18 mmol/L    Glucose 128 (H) 70 - 125 mg/dL    Calcium 7.9 (L) 8.5 - 10.5 mg/dL     (H) 8 - 22 mg/dL    Creatinine 3.85 (H) 0.70 - 1.30 mg/dL    GFR MDRD Af Amer 19 (L) >60 mL/min/1.73m2    GFR MDRD Non Af Amer 16 (L) >60 mL/min/1.73m2   Lactic Acid   Result Value Ref Range    Lactic Acid 8.1 (HH) 0.5 - 2.2 mmol/L   Hepatic Profile   Result Value Ref Range    Bilirubin, Total 0.3 0.0 - 1.0 mg/dL    Bilirubin, Direct 0.1 <=0.5 mg/dL    Protein, Total 5.3 (L) 6.0 - 8.0 g/dL    Albumin 1.3 (L) 3.5 - 5.0 g/dL    Alkaline Phosphatase 231 (H) 45 - 120 U/L    AST 42 (H)  0 - 40 U/L    ALT 36 0 - 45 U/L         Fadi Ospina MD  [facility] Surgery Dept.

## 2021-06-19 NOTE — PROGRESS NOTES
Pharmacy Note - Admission Medication History     ______________________________________________________________________    Prior To Admission (PTA) med list completed and updated in EMR.       PTA Med List   Medication Sig Last Dose     amLODIPine (NORVASC) 10 MG tablet Take 10 mg by mouth daily. Unknown at Unknown time     ferrous sulfate 325 (65 FE) MG tablet Take 1 tablet by mouth daily with breakfast. Unknown at Unknown time     omeprazole (PRILOSEC) 20 MG capsule Take 40 mg by mouth 2 (two) times a day before meals. Unknown at Unknown time     venlafaxine (EFFEXOR-XR) 75 MG 24 hr capsule Take 75 mg by mouth daily.        Information source(s): Family member and patient's wife read me the information off of his bottles.    Summary of Changes to PTA Med List  New: all are new to the list  Discontinued: none  Changed: none    Patient was asked about OTC/herbal products specifically.  PTA med list reflects this.    Based on the pharmacist s assessment, the PTA med list information appears somewhat reliable:due to records unavailable    Patient appears adherent: Unable to assess    Allergies were reviewed, assessed, and updated with the patient.      Patient does not use any multi-dose medications prior to admission.    Thank you for the opportunity to participate in the care of this patient.    Ignacio Maharaj RP  8/6/2018 9:06 PM

## 2021-06-19 NOTE — PROGRESS NOTES
Asked to see for death pronouncement.    Not responsive to voice or touch.  Pupils fixed.  No palpable carotid pulse  No cardiac activity auscultated  No respiratory effort observed or lung sounds auscultated    Rest in peace Mr. Ceja.      Time of death:  08/07/18 at 18:32.    Josefa Arzola MD  Internal Medicine Hospitalist  8/7/2018, 6:36 PM

## 2021-06-19 NOTE — CONSULTS
PALLIATIVE CARE CONSULT NOTE    PATIENT NAME: Donavan Ceja  MRN #: [id number]  DATE OF ADMISSION: 8/6/2018   DATE OF ENCOUNTER: 8/7/2018   REQUESTING PHYSICIAN: Ness  PRIMARY CARE PROVIDER: No Primary Care Provider  CONSULTANT: Salud Looney CNP  VISIT #:1   Code status:  DNR       Extended Emergency Contact Information  Primary Emergency Contact: MarcialShahnaz   [facility]  Home Phone: [phone]  Relation: Spouse       Impressions and Recommendations:     1. Generalized weakness-secondary to complex medical condition, mostly in bed  -Activity as tolerated  - Reposition for comfort      2.    Dyspnea, secondary to Sepsis and acidosis  -O2 per NC for comfort , currently on 6 liters at 94%  - Dyspnea, secondary to respiratory failure. O  -Scheduled Roxicodone q4 for dyspnea    3 . Palliative Care  (see [facility] discussion below)  GFR   Patient placed on comfort care prior to our consult.  Discussed with Dr. Arzola and Dr. Delacruz.  I added comfort care meds.   - .  Wife will not be coming in.  Patient is anticipated to die here. Palliative Care to follow and if patient plateaus, then I will refer to Hospice. -  -   Wife is concerned about Cremation cost or any costs that my occur that would not have [facility].  CHELA Valenzuela CM  to call wife.     4.   Altered Mental Status  secondary to sepsis, renal failure    - Avoid benzos, anticholinergics  - Melatonin, trazodone PRN sleep  - Nursing orders to include:  - Allow restful sleep at night (cluster cares)  - Maintain normal day/night cycle by keeping light on during day and off at night    5. Chronic Back and abd pain  -Roxicodone will help with this pain    6 . Bowel status if on scheduled Opioids:  Not ON scheduled Opioids      Discussed with  Consulting Provider,   re: above recommendations,   Palliative Care will continue to follow for symptom management and ongoing goals of care discussion         Chief Complaint Acute renal failure (ARF) (H)      HPI:     Palliative Care was requested to see patient for goals of care discussion and symptom management    Summary:  Obtained from H&P,Chart review    Donavan Ceja is a 68 y.o. old male who was brought to the emergency department by ambulance with increased confusion, weakness and slid out of bed.  Patient is a poor historian and unable to provide any meaningful history . MD was able to review a discharge summary from [facility] and also spoke with the patient's wife Mrs. Shahnaz Ceja at [phone].  Patient has history of prostate cancer with previous treatment radiation in the past.  Known history of rheumatoid arthritis, hypertension, diabetes as well as chronic diarrhea.  He was admitted to the [facility] in July 2018 with increasing confusion and renal failure.  He was found to have bilateral hydronephrosis, retroperitoneal lymphadenopathy and anal cancer.  Creatinine was up to 3 and improved to 0.7 by the time of discharge, per records patient left before receiving nephrostomy tube cares education.  He was also diagnosed with bilateral DVTs and pulmonary embolism for which Lovenox was prescribed.  He was to follow-up with oncology as an outpatient for chemotherapy.  He lives at home with his wife and has had increasing confusion.  Oxygen saturation was 86% when the ambulance and he was placed on 2 L nasal cannula.  In the ED, patient was found to have feces all over his body.  He afebrile but tachypneic and initial blood pressure 105/47, then dropped to 66/35 within 15 minutes.  He is received multiple IV boluses challenges with support of blood pressure.  Significant abnormalities in the laboratory workup including among others renal failure, hyponatremia, lactic acidosis and leukocytosis.  His stage IV rectal cancer is not treatable secondary to prior radiation for prostate cancer and issues and [facility]     Principal Problem:    Acute renal failure (ARF) (H)  Active Problems:    " Sepsis (H)    Lactic acidosis    High anion gap metabolic acidosis    Acute metabolic encephalopathy    Nephrostomy tube displaced (H)    [facility] In[facility] Hospital[facility]  [facility]    Interim Hx Since Admission date:  Seen By surgery, He is DNR now stated and at this time point and he is coagulopathic but they would like to get his his nephrostomy tubes replaced.  If he is stable, he may be will be [facility] later today.  From a surgical standpoint I do not think he needs an operation right now due to  the questionable stomach issues. I do not see any issues at this time point and no acute processes of abdomen.  If issues, could put an NG in his and decompress his stomach, however, at this time point he is doing fine, and he is  Improving with his mental status  and his sepsis is improved.  I also believe he has a component of urosepsis going on most likely from the stent issues    Discussed with Dr. Arzola, [facility]    PALLIATIVE CARE DISCUSSION     PSYCHOSOCIAL/EMOTIONAL/SPIRITUAL SUPPORT  -Living situation: . Lives with wife  -Marital Status/Children: .   -Support system:wife    Family Goals: Comfort care  Discussion:  Dr. Delacruz  Informed wife of Donavan's worsening hemodynamic status and his risk of dying soon. She is unable to come to the hospital at this time however. She asked me \"is he comfortable,\" and expressed a desire for Donavan to be comfortable. I asked to clarify, and she states that she has had prior discussions with Donavan regarding heroic measures saying \"He said that if he was close to dying he wouldn't want to linger on and wouldn't want things to keep on being done.\" I asked specifically if she would wish for him to be placed on comfort cares at this time and she agrees.    -Patient's understanding of current medical condition:  AMS, no understanding       ADVANCE CARE PLANNING  -Discussion of code status . " Patient does have a previously documented DNR/DNI at the [facility]. Confirmed by Dr. Delacruz  -Wife Shahnaz is designated medical decision maker  -Capacity: The patient does have decision-making capacity, and does demonstrate the understanding of relevant information about the condition, the available test and treatment options, use of reason to make a decision concerning these issues, and communicate choice. (DAWNA 306, 4:420-4)    Prior Health care directive,  None . [facility] record not available    Code status DNR DNI              Hospice Appropriateness    Patients does meet criteria for Hospice Medicare benefit. However, he may not survive this hospital stay. Diagnosis RF or state 4 anal cancer. , Patient does not meet criteria for inpatient hospice.     Hospice Discussion pending due to imminent nature of dying            Medical History  There are no active non-hospital problems to display for this patient.    No past medical history on file. Surgical History  He  has no past surgical history on file.   Social History  Reviewed, and he  reports that he has quit smoking. He has never used smokeless tobacco. He reports that he does not drink alcohol or use illicit drugs.   Allergies  No Known Allergies Family History  Reviewed, and family history is not on file.   Psychosocial Needs  Social History     Social History Narrative     No narrative on file     Additional psychosocial needs reviewed per nursing assessment.        Pertinent Labs:       Qtc   419     GFR 16   Lactate 8.1, down from 15    Results from last 7 days  Lab Units 08/07/18  0607 08/06/18  1725   LN-WHITE BLOOD CELL COUNT thou/uL 27.3* 43.0*  43.0*   LN-HEMOGLOBIN g/dL 10.6* 11.9*  11.9*   LN-HEMATOCRIT % 31.2* 36.4*  36.4*   LN-PLATELET COUNT thou/uL 223 312  312          Results from last 7 days  Lab Units 08/07/18  0607 08/07/18  0138 08/06/18  1725   LN-SODIUM mmol/L 130* 129* 129*   LN-POTASSIUM mmol/L 5.6* 5.5* 5.6*   LN-CHLORIDE mmol/L 98  97* 92*   LN-CO2 mmol/L 8* 7* <6*   LN-BLOOD UREA NITROGEN mg/dL 102* 99* 97*   LN-CREATININE mg/dL 3.85* 3.90* 3.81*       Results from last 7 days  Lab Units 08/07/18  0607 08/07/18  0138 08/06/18  1725   LN-CALCIUM mg/dL 7.9* 8.2* 9.7   LN-ALBUMIN g/dL 1.3*  --  1.4*   LN-PROTEIN TOTAL g/dL 5.3*  --  6.4   LN-BILIRUBIN TOTAL mg/dL 0.3  --  0.3   LN-ALKALINE PHOSPHATASE U/L 231*  --  381*   LN-ALT (SGPT) U/L 36  --  50*   LN-AST (SGOT) U/L 42*  --  33        Results from last 7 days  Lab Units 08/07/18  0607 08/06/18  1725   LN-INR  2.43* 2.58*       Lab Results   Component Value Date/Time    ALBUMIN 1.3 (L) 08/07/2018 06:07 AM      Wt Readings from Last 3 Encounters:   08/07/18 200 lb 9.6 oz (91 kg)             Pertinent Radiology  Ct Abdomen Pelvis Without Oral Without Iv Contrast    Result Date: 8/6/2018  CT ABDOMEN PELVIS WO ORAL WO IV CONTRAST 8/6/2018 5:22 PM INDICATION: Pain/bloating abdominal pain, left nephrostomy tubes TECHNIQUE: Routine CT abdomen and pelvis without oral or IV contrast. Multiplanar reformation images (MPR). Dose reduction techniques were used. COMPARISON: None. FINDINGS: LUNG BASES: Calcified pleural thickening right base. ABDOMEN: Mild right hydronephrosis with small amount of air in the collecting system and mild dilatation of the right ureter to the bladder. Right nephrostomy tube is located outside of the collecting system with tip in the posterior cortex. Moderate left hydronephrosis with the left ureter dilated to the bladder. Left nephrostomy tube tip is located in the subcutaneous fat left flank region. No calculi. The liver, spleen, pancreas, adrenal glands, and the gallbladder are unremarkable. There is questionable gas/pneumatosis within the anterior gastric wall. The rest of the stomach is distended with fluid. No evidence for pneumoperitoneum. Numerous enlarged retroperitoneal lymph nodes, largest measuring 3.4 x 2.5 cm. Smaller enlarged retrocrural nodes. PELVIS: Mildly  distended bladder with mild diffuse wall thickening. Bilateral pelvic sidewall adenopathy, the largest node measuring 4.2 x 2.1 cm. There are also several enlarged bilateral inguinal lymph nodes. No evidence for bowel obstruction. Prostatic hypertrophy. Scattered diverticula left colon. MUSCULOSKELETAL: Degenerative disc disease thoracolumbar spine.     CONCLUSION: 1.  Mild to moderate bilateral hydronephrosis. Both nephrostomy tubes are located external to the collecting systems and should be removed or repositioned. 2.  Dilatation of the ureters to the bladder where there is mild diffuse wall thickening. Study limited due to the lack of excreted contrast in the collecting systems and bladder. Urothelial tumor not excluded. 3.  Retroperitoneal and bilateral pelvic sidewall adenopathy concerning for lymphoma. Clinical correlation recommended. 4.  Questionable pneumatosis anterior gastric wall. Further characterization with repeat study after oral and IV contrast recommended.    Xr Chest 1 View Portable    Result Date: 8/6/2018  XR CHEST 1 VIEW PORTABLE 8/6/2018 6:30 PM INDICATION: Confusion COMPARISON: None. FINDINGS: Heart size at the upper limits of normal for technique. Lungs appear clear. No pleural effusion or pneumothorax.    Ct Head Without Contrast    Result Date: 8/6/2018  [facility] CT HEAD WO CONTRAST 8/6/2018 5:27 PM INDICATION: Confusion, fall TECHNIQUE: Without IV contrast. Dose reduction techniques were used. CONTRAST: None COMPARISON:  None FINDINGS: No intracranial hemorrhage, extraaxial collection, mass effect or CT evidence of acute infarct.  Nonspecific punctate and confluent areas of decreased attenuation in the periventricular and subcortical white matter, presumed sequelae of moderate chronic small vessel ischemic disease. Moderate cerebral parenchymal volume loss with concordant ex vacuo dilatation of the ventricular system. Prominent atherosclerotic calcifications in the cavernous  internal carotid arteries. Osseous structures are intact. The visualized orbits, paranasal sinuses and mastoid air cells are free of significant disease.     CONCLUSION: 1.  No evidence of acute intracranial hemorrhage, mass effect, or acute infarction by CT. 2.  Moderate cerebral parenchymal volume loss with presumed sequelae of moderate chronic small vessel ischemic disease.           Medications & Allergies   Medications:     Current Facility-Administered Medications:      bacitracin ointment packet 1 packet, 1 packet, Topical, Once PRN, Freddy Stewart MD     benzocaine-menthol lozenge 1 lozenge (CEPACOL), 1 lozenge, Oral, Q1H PRN, Manjit Freeman MD     bisacodyl suppository 10 mg (DULCOLAX), 10 mg, Rectal, Daily PRN, Manjit Freeman MD     dextrose 50 % (D50W) syringe 12.5-25 mL, 12.5-25 mL, Intravenous, PRN, Manjit Freeman MD     folic acid injection 1 mg, 1 mg, Intravenous, DAILY, Manjit Freeman MD, 1 mg at 08/07/18 0800     HYDROmorphone injection 0.5 mg (DILAUDID), 0.5 mg, Intravenous, Q2H PRN, Josefa Arzola MD     insulin aspart U-100 injection (NovoLOG), , Subcutaneous, Q4H FIXED TIMES, Manjit Freeman MD, 2 Units at 08/07/18 0401     insulin regular 1 Units/mL in sodium chloride 0.9% 250 mL, 0.5-20 Units/hr, Intravenous, Continuous PRN, Manjit Freeman MD     lidocaine 10 mg/mL (1 %) injection 0.1-0.3 mL, 0.1-0.3 mL, Subcutaneous, PRN, Freddy Stewart MD, 0.3 mL at 08/06/18 1730     multiple vitamin 3,300 unit- 150 mcg/10 mL 10 mL in dextrose 5% 500 mL IVPB, , Intravenous, DAILY, Manjit Freeman MD, Last Rate: 250 mL/hr at 08/07/18 0800     naloxone injection 0.2-0.4 mg (NARCAN), 0.2-0.4 mg, Intravenous, PRN **OR** naloxone injection 0.2-0.4 mg (NARCAN), 0.2-0.4 mg, Intramuscular, PRN, Freddy Stewart MD     ondansetron injection 4 mg (ZOFRAN), 4 mg, Intravenous, Q4H PRN **OR** ondansetron tablet 8 mg (ZOFRAN), 8 mg, Oral, Q8H PRN, Manjit Freeman MD     pantoprazole 40 mg injection, 40 mg,  Intravenous, Q24H, Manjit Freeman MD, 40 mg at 08/06/18 2250     piperacillin-tazobactam 3.375 g in NaCl 0.9 % 50 mL (MINI-BAG Plus) (ZOSYN), 3.375 g, Intravenous, Q12H, Manjit Freeman MD, Last Rate: 12.5 mL/hr at 08/07/18 0523, 3.375 g at 08/07/18 0523     polyvinyl alcohol 1.4 % ophthalmic solution 1-2 drop (LIQUIFILM TEARS), 1-2 drop, Both Eyes, Q1H PRN, Manjit Freeman MD     senna-docusate 8.6-50 mg tablet 1 tablet (PERICOLACE), 1 tablet, Oral, BID **OR** sennosides syrup 8.8 mg (for SENOKOT), 8.8 mg, Enteral Tube, BID, Manjit Freeman MD     sodium bicarbonate 150 mEq in dextrose 5% 1,000 mL, , Intravenous, Continuous, Manjit Freeman MD, Last Rate: 100 mL/hr at 08/06/18 2238     sodium chloride 0.9%, 250 mL/hr, Intravenous, Continuous, Freddy Stewart MD, Last Rate: 250 mL/hr at 08/06/18 2247, 250 mL/hr at 08/06/18 2247     sodium chloride 0.9%, 25 mL/hr, Intravenous, Continuous, Manjit Freeman MD, Stopped at 08/06/18 2247     sodium chloride flush 10-20 mL (NS), 10-20 mL, Intravenous, PRN, Freddy Stewart MD     sodium chloride flush 10-30 mL (NS), 10-30 mL, Intravenous, PRN, Freddy Stewart MD     sodium chloride flush 10-30 mL (NS), 10-30 mL, Intravenous, Q8H FIXED TIMES, Freddy Stewart MD, 10 mL at 08/06/18 2239     sodium chloride flush 20 mL (NS), 20 mL, Intravenous, PRN, Freddy Stewart MD     sodium chloride flush 3 mL (NS), 3 mL, Intravenous, Line Care, Freddy Stewart MD, 3 mL at 08/07/18 0013     thiamine 100 mg in sodium chloride 0.9% 50 mL (vitamin B1), 100 mg, Intravenous, DAILY, Manjit Freeman MD, Last Rate: 100 mL/hr at 08/07/18 0800, 100 mg at 08/07/18 0800     vancomycin intermittent dosing, , Other, Med Consult or Protocol, Manjit Freeman MD    Allergies/intolerances:    No Known Allergies         Review of Systems:  Obtained from chart review and patient and or family    Current Symptoms:    DEMENTIA: N  DELIRIUM: N  COMA: N   FAST SCORE: NA    Cannot participate in ROS    Palliative  Performance Status 20 %.    PPS: 20%- 1. Totally bed bound; 2. Unable to do any activity, extensive disease; 3. Total care; 4. Minimal to sips; 5. Full or drowsy +/- confusion           Physical Exam:  Temp:  [94.3  F (34.6  C)-97  F (36.1  C)] 95.2  F (35.1  C)  Heart Rate:  [69-81] 81  Resp:  [17-60] 37  BP: ()/(35-56) 86/49  FiO2 (%):  [35 %] 35 %    General Appearance: Alert, slight resp  distress, cooperative, fatigued   HEENT: lids, lashes, conjunctivae normal.  No scleral icterus; the mucous membranes are pink and moist. Normocephalic, without obvious abnormality, atraumatic. No nasal discharge.lips, mucosa, and tongue normal; teeth and gums normal    Neck: no JVD and symmetrical, trachea midline   Chest: The spine is straight and the chest is symmetric   Lungs: Respirations are slightly labored; the lungs are clear to auscultation. O2 delivery by NC, decreased in bases   Cardiovasular: Auscultation reveals normal first and second heart sounds with no murmurs, rubs, or gallops   Abdomen: Large in size, tender to touch,  bowel sounds are present   Extremities: No  clubbing or edema. No venous stasis or varicosities noted. Pulses: 2+ and symmetric  Bruised at knees bilat. Warm to touch   Skin: Skin color , texture and turgor normal. No rashes or lesions                      Salud Looney  MSN, NP-C, APRN,ACHPN  Discussed with MD KIET and RN    Office #:[phone] (Admin will page me if needed and I will return call)      Billing Information:      E/M level:85 minutes,>50% of time during encounter was spent with patient and/or care coordination, as documented above.

## 2021-06-19 NOTE — PROGRESS NOTES
Called to room in ER, pt RR in the 30s, pt was on 2L and RN increased him to 5L , went to CT, when back in room gave the duoneb, pt did not want it, given by devyn for ~ half tx and then stopped. BS clear and slightly coarse, Pt asking for soup.

## 2021-06-19 NOTE — PROGRESS NOTES
"Called patient's wife Olga Christie to have discussion regarding goals of care.  I informed her of Donavan's worsening hemodynamic status and his risk of dying soon. She is unable to come to the hospital at this time however. She asked me \"is he comfortable,\" and expressed a desire for Donavan to be comfortable. I asked to clarify, and she states that she has had prior discussions with Donavan regarding heroic measures saying \"He said that if he was close to dying he wouldn't want to linger on and wouldn't want things to keep on being done.\" I asked specifically if she would wish for him to be placed on comfort cares at this time and she agrees. Patient does have a previously documented DNR/DNI at the [facility].    -Patient to be placed on comfort cares, will inform wife with updates.   -Patient signed out to Dr. Arzola.   -Patient wishes to be cremated.      Ubaldo Delacruz MD PGY3  [facility]      Faculty Supervision of Residents    I have examined this patient and the medical care has been evaluated and discussed with the resident.  The documentation has been reviewed.  I agree with the medical care provided and confirm the findings.       Olinda Marie  Pulmonary and Critical Care  [id number]          "

## 2021-06-19 NOTE — CONSULTS
Critical Care Medicine Consultation             Date of Service: 8/6/2018    Reason for Consult: sepsis    HPI: This is a 68 y.o. male with a history of stage IV squamous cell anal cancer, h/o prostate ca s/p XRT, bilateral nephrostomy tubes, recent DVT and ? PE on mid-July of 2017, and chronic pain who was brought in by EMS for altered mental status.  In the ED, patient was found to be confused and unable to provide any history.  He lives with his wife at home.  Per his wife, she had reported that he had increased weakness and confusion.  In the ED, he was found covered with feces and has his left-sided nephrostomy tube displaced.  The right sided nephrostomy tube appears to be in good position.  Although his blood pressure was acceptable initially, his systolic blood pressure dropped to the 60s and patient required additional fluid boluses.  He received approximately 2-3 L of normal saline in the ED.  CT scan of the abdomen was done that showed a questionable air in the gastric wall as well as a displaced left sided nephrostomy tube.  Patient was found markedly dehydrated.  His lactic acid was elevated at around 15.  Patient complained of pains all over.  [facility] and therefore patient was admitted here.    PMHx/PSHx:  DVT left leg 7/16  Recent PE  Anal squamous cell cancer with mets to retroperitoneum  Chronic diarrhea  Chronic pain - on methadone 5 mg q12h, baclofen/gabapentin  DM  HTN  biLateral nephrostomy tubes placement on 7/2016  H/o prostate cancer.    No Known Allergies    (Not in a hospital admission)  Social History     Social History     Marital status:      Spouse name: N/A     Number of children: N/A     Years of education: N/A     Social History Main Topics     Smoking status: Not on file     Smokeless tobacco: Not on file     Alcohol use Not on file     Drug use: Not on file     Sexual activity: Not on file     Other Topics Concern     Not  on file     Social History Narrative     No family history on file.  Review of Systems  Unable to be obtained due to critical illness and fluctuating mental status    Vitals  BP  Min: 66/35  Max: 105/47  Temp  Min: 96.3  F (35.7  C)  Max: 97  F (36.1  C)  Pulse  Min: 75  Max: 80  Resp  Min: 29  Max: 55  SpO2  Min: 91 %  Max: 98 %  No intake or output data in the 24 hours ending 08/06/18 2059          Physical Exam:  Gen: Lying in bed, in minimal distress  HEENT: dry oral mucosa, poor dentition.  Neck: Supple. Trachea midline without shift.  Pulm: Normal work of breathing. Lungs clear to auscultation bilaterally without crackles or wheezes.   Chest: Equal chest rise, normal conformation.  CV: RRR, S1, S2, no murmurs, rubs, gallops. Peripheral pulses intact. Cap refill intact.   Abd: Soft, minimal tenderness to deep palpation, but no rebound or garding  Back: dino nephrostomy tubes  Ext: Overall warm and perfused, no clubbing, cyanosis, or edema  Neuro: moves all extremities, AAO x 3 although needs frequent re-orientation  Skin: No obvious rash or petechiae. Warm, dry.    Diagnostic Data: Reviewed independently by me.    Results from last 7 days  Lab Units 08/06/18  1725   LN-WHITE BLOOD CELL COUNT thou/uL 43.0*  43.0*   LN-HEMOGLOBIN g/dL 11.9*  11.9*   LN-HEMATOCRIT % 36.4*  36.4*   LN-PLATELET COUNT thou/uL 312  312   LN-MONOCYTES - REL (DIFF) % 2       Results from last 7 days  Lab Units 08/06/18  1725   LN-SODIUM mmol/L 129*   LN-POTASSIUM mmol/L 5.6*   LN-CHLORIDE mmol/L 92*   LN-CO2 mmol/L <6*   LN-BLOOD UREA NITROGEN mg/dL 97*   LN-CREATININE mg/dL 3.81*   LN-CALCIUM mg/dL 9.7   LN-PROTEIN TOTAL g/dL 6.4   LN-BILIRUBIN TOTAL mg/dL 0.3   LN-ALKALINE PHOSPHATASE U/L 381*   LN-ALT (SGPT) U/L 50*   LN-AST (SGOT) U/L 33     Results from last 7 days  Lab Units 08/06/18  1725   LN-MAGNESIUM mg/dL 2.8*     Lab Results   Component Value Date    CALCIUM 9.7 08/06/2018        Results from last 7 days  Lab Units  08/06/18  1725   LN-INR  2.58*     No results found for: HGBA1C    Results from last 7 days  Lab Units 08/06/18  1725   LN-CREATINE KINASE TOTAL U/L 47   LN-TROPONIN I ng/mL 0.02       Imaging:     Ct Abdomen Pelvis Without Oral Without Iv Contrast    Result Date: 8/6/2018  CT ABDOMEN PELVIS WO ORAL WO IV CONTRAST 8/6/2018 5:22 PM INDICATION: Pain/bloating abdominal pain, left nephrostomy tubes TECHNIQUE: Routine CT abdomen and pelvis without oral or IV contrast. Multiplanar reformation images (MPR). Dose reduction techniques were used. COMPARISON: None. FINDINGS: LUNG BASES: Calcified pleural thickening right base. ABDOMEN: Mild right hydronephrosis with small amount of air in the collecting system and mild dilatation of the right ureter to the bladder. Right nephrostomy tube is located outside of the collecting system with tip in the posterior cortex. Moderate left hydronephrosis with the left ureter dilated to the bladder. Left nephrostomy tube tip is located in the subcutaneous fat left flank region. No calculi. The liver, spleen, pancreas, adrenal glands, and the gallbladder are unremarkable. There is questionable gas/pneumatosis within the anterior gastric wall. The rest of the stomach is distended with fluid. No evidence for pneumoperitoneum. Numerous enlarged retroperitoneal lymph nodes, largest measuring 3.4 x 2.5 cm. Smaller enlarged retrocrural nodes. PELVIS: Mildly distended bladder with mild diffuse wall thickening. Bilateral pelvic sidewall adenopathy, the largest node measuring 4.2 x 2.1 cm. There are also several enlarged bilateral inguinal lymph nodes. No evidence for bowel obstruction. Prostatic hypertrophy. Scattered diverticula left colon. MUSCULOSKELETAL: Degenerative disc disease thoracolumbar spine.     CONCLUSION: 1.  Mild to moderate bilateral hydronephrosis. Both nephrostomy tubes are located external to the collecting systems and should be removed or repositioned. 2.  Dilatation of the  ureters to the bladder where there is mild diffuse wall thickening. Study limited due to the lack of excreted contrast in the collecting systems and bladder. Urothelial tumor not excluded. 3.  Retroperitoneal and bilateral pelvic sidewall adenopathy concerning for lymphoma. Clinical correlation recommended. 4.  Questionable pneumatosis anterior gastric wall. Further characterization with repeat study after oral and IV contrast recommended.    Xr Chest 1 View Portable    Result Date: 8/6/2018  XR CHEST 1 VIEW PORTABLE 8/6/2018 6:30 PM INDICATION: Confusion COMPARISON: None. FINDINGS: Heart size at the upper limits of normal for technique. Lungs appear clear. No pleural effusion or pneumothorax.    Ct Head Without Contrast    Result Date: 8/6/2018  [facility] CT HEAD WO CONTRAST 8/6/2018 5:27 PM INDICATION: Confusion, fall TECHNIQUE: Without IV contrast. Dose reduction techniques were used. CONTRAST: None COMPARISON:  None FINDINGS: No intracranial hemorrhage, extraaxial collection, mass effect or CT evidence of acute infarct.  Nonspecific punctate and confluent areas of decreased attenuation in the periventricular and subcortical white matter, presumed sequelae of moderate chronic small vessel ischemic disease. Moderate cerebral parenchymal volume loss with concordant ex vacuo dilatation of the ventricular system. Prominent atherosclerotic calcifications in the cavernous internal carotid arteries. Osseous structures are intact. The visualized orbits, paranasal sinuses and mastoid air cells are free of significant disease.     CONCLUSION: 1.  No evidence of acute intracranial hemorrhage, mass effect, or acute infarction by CT. 2.  Moderate cerebral parenchymal volume loss with presumed sequelae of moderate chronic small vessel ischemic disease.       ECG: NSR    ASSESSMENT:     This is a 68 y.o. male with history of  history of stage IV squamous cell anal cancer, h/o prostate ca s/p XRT, bilateral  nephrostomy tubes, recent DVT and ? PE on mid-July of 2017, and chronic pain admitted on 8/6/2018 with AMS.  Patient's altered mental status improved after IV hydration.  His lactate was elevated around 15 on admission and a repeat lactic acid is pending.  CT head was negative.  He does have history of stage IV anal cancer however recent [facility].  I suspect that his altered mental status is likely multifactorial secondary to sepsis from possible pyelonephritis given the dislodgment of his nephrostomy tubes, dehydration, uremia with acute on chronic kidney failure, chronic pain meds (polypharmacy) as patient is on methadone, baclofen and gabapentin.    PLAN/RECOMMENDATIONS:    Neuro: Altered mental status multifactorial likely sepsis, renal failure, elevated ammonia, dehydration  -CT head negative  -Treating infection, hydration  -hold methadone, gabapentin, and baclofen  -continue to monitor    Pulmonary: cxr neg for infiltrate or effusion. Recent history of DVT and I cannot find imaging of his CTA [facility].    - on 5 Liters nasal canula  - therapeutic however in anticipation of replacing his nephrostomy tubes, I discuss case with IR and they would like his INR to be below 1.5. He is on lovenox which should not affect his INR.  - check anti-Xa level, hold Lovenox for now in anticipation of replacing his nephrostomy tube in am. Will not reverse his INR especially with recent dvt around 3 weeks ago  - will also place order for IVC retrievable filter given that he had a recent DVT and in anticipation of procedure, but that will depend if wife is agreeable.    Cardiovascular: septic shock with elevated lactate and hypotension  -IVF 2-3 liters given in ED  -has a PICC line in case we need pressors  -serial lactates    GI: Questionable pneumatosis anterior gastric wall.  Unable to give contrast given renal function.  Concerning in the setting of elevated  "lactate  -Surgery was consulted in the ED and case was discussed with Dr. Ospina  -pt likely would be a poor surgical candidate and we will need address goals neil in setting of his stage 4 rectal cancer  -place NGT (refusing), IV Abx, IVF  -thiamine/folate/MVI in setting of h/o alcohol abuse    Renal: acute kam failure, hyperkalemia, AG met acidosis  - IVF, I's and O's avoid nephrotoxic agents  -Serial BMP and check ABG  -Start on bicarbonate drip    ID: Sepsis with source most likely secondary to urosepsis especially with the displacement of the left sided nephrostomy tube.  He does have a right sided functional nephrostomy tube as well.  Source could also be potentially due to intra-abdominal source from gastric perforation especially with the pneumatosis is noted on CT scan of the abdomen  -Broad-spectrum antibiotics. Of fevers or remains septic, then add antifungals with the possible of gastric perforation (however unlikely based on presentation)  -Dw IR to replace nephrostomy tube which they will do tomorrow, but will need to clarify with wife first.    Heme/Onc: Rectal squamous cell cancer with metastases, DVT left Lower extremity, and possible PE  -on enoxaparin at home (1 mg /kg two times a day) for DVT and possible PE  - HOLD enoxaparin tonight    Endocrine: DM type 2  -hold home meds for now  -Insulin as needed to keep glucose <180. Avoid hypoglycemia.    Code Status: DNR/DNI per records from the [facility]. Would likely benefit from palliative care.    I called Yasemin, his wife, and she notes that she wants to focus on comfort and does not want any thing \"excessive\". When I asked her about nephrostomy tubes, she says she wouldn't want those either.. I asked her to come in the morning so we can discuss goals but she says she would not be able but that she would like to be called. I will ask palliative medicine to get involved to clarify goals of care.    This patient is critically ill and remains at " risk for further deterioration, organ failure, and/or death.     Critical Care Time: 65 minutes, not including separately billable procedures    Manjit Freeman MD  Critical Care Medicine     Much or all of the text in this note was generated through the use of the Dragon Dictate voice-to-text software. Errors in spelling or words which seem out of context are unintentional. Sound alike errors, in particular, may have escaped editing.

## 2021-06-19 NOTE — PROGRESS NOTES
COMFORT CARES  DATA:  Respiratory rate 28 breaths per minute and patient is unresponsive to verbal stimuli.  ACTION: Continue to monitor and assess patient status.

## 2021-07-01 NOTE — PROCEDURES
"Procedures by Jacquelyn Almonte RN at 8/6/2018  8:07 PM     Author: Jacquelyn Almonte RN Service: Emergency Medicine Author Type: Registered Nurse    Filed: 8/6/2018  8:09 PM Date of Service: 8/6/2018  8:07 PM Status: Signed    : Jacquelyn Almonte RN (Registered Nurse)       Procedures      PICC Line Insertion Procedure Note  Pt. Name: Donavan Ceja  MRN:        [id number]    Procedure: Insertion of a  triple Lumen  5 fr  Bard SOLO (valved) Power PICC, Lot number [id number]    Indications: sepsis protocol    Contraindications : none    Procedure Details   Patient identified with 2 identifiers and \"Time Out\" conducted.  .     Central line insertion bundle followed: hand hygeine performed prior to procedure, site cleansed with cholraprep, hat, mask, sterile gloves,sterile gown worn, patient draped with maximum barrier head to toe drape, sterile field maintained.    The vein was assessed and found to be compressible and of adequate size. 2 ml 1% Lidocaine administered sq to the insertion site. A 5 Fr PICC was inserted into the basilic vein of the right arm with ultrasound guidance. 1 attempt(s) required to access vein.   Catheter threaded without difficulty. Good blood return noted.    Modified Seldinger Technique used for insertion.    The 8 sharps that are included in the PICC insertion kit were accounted for and disposed of in the sharps container prior to breakdown of the sterile field.    Catheter secured with Statlock, biopatch and Tegaderm dressing applied.    Findings:  Total catheter length  44 cm, with 2 cm exposed. Mid upper arm circumference is 34 cm. Catheter was flushed with 30 cc NS. Patient  tolerated procedure well.    Tip placement verified by ecg technology. Tip placement in the SVC near CAJ.    CLABSI prevention brochure left at bedside.    Patient's primary RN notified PICC is ready for use.    Comments:          Jacquelyn Almonte RN    [facility] " access [phone]

## 2022-11-23 NOTE — H&P
Admission History and Physical   Donavan Ceja,  1950, MRN [id number]    [facility]  Principal Problem:    Acute renal failure (ARF) (H)  Active Problems:    Sepsis (H)      PCP: No Primary Care Provider, None   Code status:  DNR       No emergency contact information on file.       Day of Service: 2018.    Assessment and Plan   1.  Sepsis, suspected bilateral pyelonephritis.  Continue broad-spectrum antibiotics and follow-up cultures.  Intensivist input appreciated.  2.  Lactic acidosis, metabolic acidosis with high anion gap.  Bicarbonate drip, frequent electrolyte check.  3.  Acute kidney injury, obstructive uropathy.  Secondary to dislodged nephrostomy tubes.  Will need IR replacement, however patient is coagulopathic and on Lovenox anticoagulation.  4.  Acute metabolic encephalopathy.  Secondary to sepsis, renal failure.  Monitor neuro status.  5.  Acute respiratory failure with hypoxia.  Oxygen saturation 86% on room air, tachypnea likely to compensate metabolic acidosis.  Oxygen via nasal cannula to keep saturation above 92%.  6.  Stage IV anal cancer.  CT scan showed retroperitoneal and pelvic lymphadenopathy.  Not a candidate for radiation due to previous prostate radiation.  Outpatient follow-up with oncology.  7.  Hyponatremia.  Secondary to hypovolemia and sepsis.  Intravenous hydration.  8.  Bilateral DVT, PE.  Hold anticoagulation for nephrostomy tube exchange.  9.  Hyperkalemia.  Secondary to renal failure.  Cardiac monitoring and recheck.  10.  Peptic ulcer disease.  CT scan showed possible stomach pneumatosis but abdomen is benign on exam.  General surgery consult appreciated.  Continue IV PPIs.  11.  Type 2 diabetes, with long-term insulin.  Monitor with insulin sliding scale.  12.  Chronic diarrhea.  Supportive management.  13.  Hypoalbuminemia.  Likely combination of severe malnutrition and acute inflammation in the setting of active cancer illness and  sepsis.    Disposition: ICU, inpatient for at least 2 midnights.  Barriers to discharge: Sepsis, lactic acidosis.  DVT prophylaxis: Heparin after nephrostomy tubes exchange.  CODE STATUS: DNR.  I was able to talk to Mrs. Shahnaz Ceja and confirmed status.     Chief Complaint:  Confusion, weakness, slid out of bed.     HPI:    Donavan Ceja is a 68 y.o. old male who was brought to the emergency department by ambulance with increased confusion, weakness and slid out of bed.  Patient is a poor historian and unable to provide any meaningful history at this point in time although based on report he is now more talkative than when he arrived into the ED.  I was able to review a discharge summary from [facility] and also spoke with the patient's wife Mrs. Shahnaz Ceja at [phone].  Patient has history of prostate cancer with previous treatment radiation in the past.  Known history of rheumatoid arthritis, hypertension, diabetes as well as chronic diarrhea.  He was admitted to the [facility] in July 2018 with increasing confusion and renal failure.  He was found to have bilateral hydronephrosis, retroperitoneal lymphadenopathy and anal cancer.  Creatinine was up to 3 and improved to 0.7 by the time of discharge, per records patient left before receiving nephrostomy tube cares education.  He was also diagnosed with bilateral DVTs and pulmonary embolism for which Lovenox was prescribed.  He was to follow-up with oncology as an outpatient for chemotherapy.  He lives at home with his wife and has had increasing confusion.  Oxygen saturation was 86% when the ambulance and he was placed on 2 L nasal cannula.  In the ED, patient was found to have feces all over his body.  He afebrile but tachypneic and initial blood pressure 105/47, then dropped to 66/35 within 15 minutes.  He is received multiple IV boluses challenges with support of blood pressure.  Significant abnormalities in the laboratory workup including among  others renal failure, hyponatremia, lactic acidosis and leukocytosis.         Medical History  Prostate cancer  Chronic diarrhea  Hypertension  Diabetes   Surgical History  Anal mass biopsy       Social History  Reviewed, and denies tobacco, alcohol, drugs.     Allergies  No Known Allergies Family History  FH reviewed and not pertinent to chief complaint or history of present illness.           Prior to Admission Medications   Prescriptions Prior to Admission   Medication Sig Dispense Refill Last Dose     amLODIPine (NORVASC) 10 MG tablet Take 10 mg by mouth daily.   Unknown at Unknown time     ferrous sulfate 325 (65 FE) MG tablet Take 1 tablet by mouth daily with breakfast.   Unknown at Unknown time     omeprazole (PRILOSEC) 20 MG capsule Take 40 mg by mouth 2 (two) times a day before meals.   Unknown at Unknown time     venlafaxine (EFFEXOR-XR) 75 MG 24 hr capsule Take 75 mg by mouth daily.             Review of Systems:  All system negative except as mentioned in the HPI.      Physical Exam:  Vitals:    08/06/18 2217   BP: 94/52   Pulse: 75   Resp: (!) 38   Temp: (!) 95.2  F (35.1  C)   SpO2: 97%     190 lb 9.6 oz (86.5 kg)  Body mass index is 30.76 kg/(m^2).    General: Appears chronically ill.  HEENT: Dry mucosa, EOMI.  Neck: Nontender, supple, no JVD.  Heart: Regular rate and rhythm.  Lungs: Bilateral air entry, no stridors.  Abdomen: Distended, soft, bowel sounds present, mild diffuse tenderness, no guarding.  Extremities: Nontender, no edema.  Skin: Mottled areas in the knees and left foot but warm to the touch.  Neuro: Oriented to self only, moves all 4 extremities.  Unable to state location, time or current president.  Psychiatric: Cooperative, pleasant.      Pertinent Labs  Lab Results: Personally reviewed.  Results for orders placed or performed during the hospital encounter of 08/06/18   Ammonia   Result Value Ref Range    Ammonia 60 (H) 11 - 35 umol/L   Basic Metabolic Panel   Result Value Ref Range     Sodium 129 (L) 136 - 145 mmol/L    Potassium 5.6 (H) 3.5 - 5.0 mmol/L    Chloride 92 (L) 98 - 107 mmol/L    CO2 <6 (LL) 22 - 31 mmol/L    Anion Gap, Calculation >31 (H) 5 - 18 mmol/L    Glucose 159 (H) 70 - 125 mg/dL    Calcium 9.7 8.5 - 10.5 mg/dL    BUN 97 (H) 8 - 22 mg/dL    Creatinine 3.81 (H) 0.70 - 1.30 mg/dL    GFR MDRD Af Amer 19 (L) >60 mL/min/1.73m2    GFR MDRD Non Af Amer 16 (L) >60 mL/min/1.73m2   Hepatic Profile   Result Value Ref Range    Bilirubin, Total 0.3 0.0 - 1.0 mg/dL    Bilirubin, Direct 0.2 <=0.5 mg/dL    Protein, Total 6.4 6.0 - 8.0 g/dL    Albumin 1.4 (L) 3.5 - 5.0 g/dL    Alkaline Phosphatase 381 (H) 45 - 120 U/L    AST 33 0 - 40 U/L    ALT 50 (H) 0 - 45 U/L   Lactic Acid   Result Value Ref Range    Lactic Acid 15.2 (HH) 0.5 - 2.2 mmol/L   Lipase   Result Value Ref Range    Lipase 19 0 - 52 U/L   HM2 (CBC W/O DIFF)   Result Value Ref Range    WBC 43.0 (HH) 4.0 - 11.0 thou/uL    RBC 3.87 (L) 4.40 - 6.20 mill/uL    Hemoglobin 11.9 (L) 14.0 - 18.0 g/dL    Hematocrit 36.4 (L) 40.0 - 54.0 %    MCV 94 80 - 100 fL    MCH 30.7 27.0 - 34.0 pg    MCHC 32.7 32.0 - 36.0 g/dL    RDW 18.5 (H) 11.0 - 14.5 %    Platelets 312 140 - 440 thou/uL    MPV 11.4 8.5 - 12.5 fL   CK   Result Value Ref Range    CK, Total 47 30 - 190 U/L   Magnesium   Result Value Ref Range    Magnesium 2.8 (H) 1.8 - 2.6 mg/dL   INR   Result Value Ref Range    INR 2.58 (H) 0.90 - 1.10   Troponin I   Result Value Ref Range    Troponin I 0.02 0.00 - 0.29 ng/mL   HM1 (CBC with Diff)   Result Value Ref Range    WBC 43.0 (HH) 4.0 - 11.0 thou/uL    RBC 3.87 (L) 4.40 - 6.20 mill/uL    Hemoglobin 11.9 (L) 14.0 - 18.0 g/dL    Hematocrit 36.4 (L) 40.0 - 54.0 %    MCV 94 80 - 100 fL    MCH 30.7 27.0 - 34.0 pg    MCHC 32.7 32.0 - 36.0 g/dL    RDW 18.5 (H) 11.0 - 14.5 %    Platelets 312 140 - 440 thou/uL    MPV 11.4 8.5 - 12.5 fL   Manual Differential   Result Value Ref Range    Total Neutrophils % 91 (H) 50 - 70 %    Lymphocytes % 4 (L) 20 -  40 %    Monocytes % 2 2 - 10 %    Eosinophils %  1 0 - 6 %    Basophils % 0 0 - 2 %    Myelocytes % 2 (H) <=1 %    Total Neutrophils Absolute 39.1 (H) 2.0 - 7.7 thou/ul    Lymphocytes Absolute 1.7 0.8 - 4.4 thou/uL    Monocytes Absolute 0.9 0.0 - 0.9 thou/uL    Eosinophils Absolute 0.4 0.0 - 0.4 thou/uL    Basophils Absolute 0.0 0.0 - 0.2 thou/uL    Myelocytes Absolute 0.9 (H) <=0.1 thou/uL    Platelet Estimate Normal Normal   Lactic Acid   Result Value Ref Range    Lactic Acid 10.2 (HH) 0.5 - 2.2 mmol/L   ECG 12 lead nursing unit performed   Result Value Ref Range    SYSTOLIC BLOOD PRESSURE  mmHg    DIASTOLIC BLOOD PRESSURE  mmHg    VENTRICULAR RATE 74 BPM    ATRIAL RATE 74 BPM    P-R INTERVAL 196 ms    QRS DURATION 102 ms    Q-T INTERVAL 378 ms    QTC CALCULATION (BEZET) 419 ms    P Axis 64 degrees    R AXIS 57 degrees    T AXIS 77 degrees    MUSE DIAGNOSIS       Normal sinus rhythm  Normal ECG  No previous ECGs available             Pertinent Radiology   Radiology Results: Personally reviewed.  CT ABDOMEN PELVIS WO ORAL WO IV CONTRAST  8/6/2018 5:22 PM     INDICATION: Pain/bloating abdominal pain, left nephrostomy tubes  TECHNIQUE: Routine CT abdomen and pelvis without oral or IV contrast. Multiplanar reformation images (MPR). Dose reduction techniques were used.  COMPARISON: None.     FINDINGS:  LUNG BASES: Calcified pleural thickening right base.     ABDOMEN: Mild right hydronephrosis with small amount of air in the collecting system and mild dilatation of the right ureter to the bladder. Right nephrostomy tube is located outside of the collecting system with tip in the posterior cortex. Moderate   left hydronephrosis with the left ureter dilated to the bladder. Left nephrostomy tube tip is located in the subcutaneous fat left flank region. No calculi. The liver, spleen, pancreas, adrenal glands, and the gallbladder are unremarkable. There is   questionable gas/pneumatosis within the anterior gastric wall. The  rest of the stomach is distended with fluid. No evidence for pneumoperitoneum. Numerous enlarged retroperitoneal lymph nodes, largest measuring 3.4 x 2.5 cm. Smaller enlarged retrocrural   nodes.     PELVIS: Mildly distended bladder with mild diffuse wall thickening. Bilateral pelvic sidewall adenopathy, the largest node measuring 4.2 x 2.1 cm. There are also several enlarged bilateral inguinal lymph nodes. No evidence for bowel obstruction.   Prostatic hypertrophy. Scattered diverticula left colon.     MUSCULOSKELETAL: Degenerative disc disease thoracolumbar spine.     IMPRESSION:   CONCLUSION:  1.  Mild to moderate bilateral hydronephrosis. Both nephrostomy tubes are located external to the collecting systems and should be removed or repositioned.  2.  Dilatation of the ureters to the bladder where there is mild diffuse wall thickening. Study limited due to the lack of excreted contrast in the collecting systems and bladder. Urothelial tumor not excluded.  3.  Retroperitoneal and bilateral pelvic sidewall adenopathy concerning for lymphoma. Clinical correlation recommended.  4.  Questionable pneumatosis anterior gastric wall. Further characterization with repeat study after oral and IV contrast recommended.      [facility]  CT HEAD WO CONTRAST  8/6/2018 5:27 PM     INDICATION: Confusion, fall  TECHNIQUE: Without IV contrast. Dose reduction techniques were used.  CONTRAST: None  COMPARISON:  None     FINDINGS: No intracranial hemorrhage, extraaxial collection, mass effect or CT evidence of acute infarct.  Nonspecific punctate and confluent areas of decreased attenuation in the periventricular and subcortical white matter, presumed sequelae of   moderate chronic small vessel ischemic disease. Moderate cerebral parenchymal volume loss with concordant ex vacuo dilatation of the ventricular system. Prominent atherosclerotic calcifications in the cavernous internal carotid arteries. Osseous   structures are  intact. The visualized orbits, paranasal sinuses and mastoid air cells are free of significant disease.         IMPRESSION:   CONCLUSION:  1.  No evidence of acute intracranial hemorrhage, mass effect, or acute infarction by CT.  2.  Moderate cerebral parenchymal volume loss with presumed sequelae of moderate chronic small vessel ischemic disease.      XR CHEST 1 VIEW PORTABLE  8/6/2018 6:30 PM     INDICATION: Confusion  COMPARISON: None.     FINDINGS: Heart size at the upper limits of normal for technique. Lungs appear clear. No pleural effusion or pneumothorax.         EKG Results: Sinus rhythm at 74 bpm, no previous EKG to compare, nonspecific ST waves.      Advanced Care Planning  Disposition: Inpatient.  Time spent was 60 minutes were more than 50% was in counseling and or coordination of cares.     Libtayo Pregnancy And Lactation Text: This medication is contraindicated in pregnancy and when breast feeding.

## 2024-05-29 NOTE — CONSULTS
Consultation - Surgery  Donavan Ceja,  1950, MRN [id number]    Admitting Dx: There are no admission diagnoses documented for this encounter.    PCP: No Primary Care Provider, None   Code status:  No Order       No emergency contact information on file.       Assessment and Plan     Principal Problem:    Acute renal failure (ARF) (H)  Stage IV rectal cancer  Bilateral nephrostomy tubes secondary to presumed obstruction  [facility] do not have [facility] here they obtained some records  He came in here confused and somewhat combative he is now cleared up and actually pretty clear from a discussion plan spent standpoint and answering questions.    He had a CT scan here which shows that his stents are out of place he has some distended abdominal stomach question whether is air in the wall but his abdominal exam is really unremarkable at this time point.  I would recommend that he get his acute renal issues probably resolved with resuscitation and probably restenting him and he will probably be able to be [facility] where they know him quite well.  There is any question I think an NG should be placed in the stomach to decompress the stomach and his lactate and his other issues are probably related to the renal things are going on along with probably some urosepsis his white count is extremely high in the 40s.     Chief Complaint Acute renal failure (ARF) (H)       HPI    We have been requested by ED to evaluate Donavan Ceja for low blood pressure confusion who is a 68 y.o. year old male for who is brought into the hospital today by ambulance from a his wife.  [facility] is a [facility] does not normally seek care or we do not have any records here of him so what we have is limited is [facility] that he has stage IV metastatic rectal cancer he has bilateral ureteral stents secondary obstruction presumed from his rectal cancer.  He is unsure of treatments he does  say that he does not think he is being treated for his cancer presently.  [facility].  I been asked to see him secondary to him looking a little septic to begin with they worked up with a CT scan his ureteral stents are out he has no urine looks to be in year and renal failure also looks also to be probably having urosepsis with a high white count.  He came in covered in stool he has abdominal pain but this is chronic when asked regarding some of the records obtained from the [facility].  He has some abdominal pain today but it is minimal and none and no different than normal he states.  There is question of ischemic issues of the stomach on the CT scan or possibly air in the wall but no definitive findings.       Medical History  There are no active non-hospital problems to display for this patient.    No past medical history on file. Surgical History  He  has no past surgical history on file.   Social History  Reviewed, and he     Allergies  No Known Allergies Family History  Reviewed, and family history is not on file.   Psychosocial Needs  Social History     Social History Narrative     Additional psychosocial needs reviewed per nursing assessment.     Prior to Admission Medications   Prescriptions Prior to Admission   Medication Sig Dispense Refill Last Dose     amLODIPine (NORVASC) 10 MG tablet Take 10 mg by mouth daily.   Unknown at Unknown time     ferrous sulfate 325 (65 FE) MG tablet Take 1 tablet by mouth daily with breakfast.   Unknown at Unknown time     omeprazole (PRILOSEC) 20 MG capsule Take 40 mg by mouth 2 (two) times a day before meals.   Unknown at Unknown time     venlafaxine (EFFEXOR-XR) 75 MG 24 hr capsule Take 75 mg by mouth daily.             Review of Systems:  Pertinent items are noted in HPI.  Review of systems not obtained due to inability to communicate with the patient.  Physical Exam:  Temp:  [96.3  F (35.7  C)-97  F (36.1  C)] 96.9  F (36.1  C)  Heart Rate:  [74-80]  75  Resp:  [29-55] 38  BP: ()/(35-56) 121/53    General appearance: appears older than stated age, cooperative, fatigued, mild distress and slowed mentation  Head: Normocephalic, without obvious abnormality, atraumatic, Extremely poor dentition  Eyes: conjunctivae/corneas clear. PERRL, EOM's intact. Fundi benign.  Nose: Nares normal. Septum midline. Mucosa normal. No drainage or sinus tenderness.  Neck: supple, symmetrical, trachea midline  Lungs: Coarse breath sounds bilaterally  Heart: regular rate and rhythm, S1, S2 normal, no murmur, click, rub or gallop  Abdomen: Soft some distention he is, tender throughout but very minimal tenderness no rebound or guarding is noted today.  Extremities: Moves all extremities at this time point.  Pulses: Weak pulses but they are still palpable bilateral lower extremities and upper extremities  Neurologic: He is alert responds and answers questions       Pertinent Labs  Lab Results: personally reviewed.   Recent Results (from the past 24 hour(s))   Ammonia   Result Value Ref Range    Ammonia 60 (H) 11 - 35 umol/L   Basic Metabolic Panel   Result Value Ref Range    Sodium 129 (L) 136 - 145 mmol/L    Potassium 5.6 (H) 3.5 - 5.0 mmol/L    Chloride 92 (L) 98 - 107 mmol/L    CO2 <6 (LL) 22 - 31 mmol/L    Anion Gap, Calculation >31 (H) 5 - 18 mmol/L    Glucose 159 (H) 70 - 125 mg/dL    Calcium 9.7 8.5 - 10.5 mg/dL    BUN 97 (H) 8 - 22 mg/dL    Creatinine 3.81 (H) 0.70 - 1.30 mg/dL    GFR MDRD Af Amer 19 (L) >60 mL/min/1.73m2    GFR MDRD Non Af Amer 16 (L) >60 mL/min/1.73m2   Hepatic Profile   Result Value Ref Range    Bilirubin, Total 0.3 0.0 - 1.0 mg/dL    Bilirubin, Direct 0.2 <=0.5 mg/dL    Protein, Total 6.4 6.0 - 8.0 g/dL    Albumin 1.4 (L) 3.5 - 5.0 g/dL    Alkaline Phosphatase 381 (H) 45 - 120 U/L    AST 33 0 - 40 U/L    ALT 50 (H) 0 - 45 U/L   Lactic Acid   Result Value Ref Range    Lactic Acid 15.2 (HH) 0.5 - 2.2 mmol/L   Lipase   Result Value Ref Range    Lipase 19 0 -  52 U/L   HM2 (CBC W/O DIFF)   Result Value Ref Range    WBC 43.0 (HH) 4.0 - 11.0 thou/uL    RBC 3.87 (L) 4.40 - 6.20 mill/uL    Hemoglobin 11.9 (L) 14.0 - 18.0 g/dL    Hematocrit 36.4 (L) 40.0 - 54.0 %    MCV 94 80 - 100 fL    MCH 30.7 27.0 - 34.0 pg    MCHC 32.7 32.0 - 36.0 g/dL    RDW 18.5 (H) 11.0 - 14.5 %    Platelets 312 140 - 440 thou/uL    MPV 11.4 8.5 - 12.5 fL   CK   Result Value Ref Range    CK, Total 47 30 - 190 U/L   Magnesium   Result Value Ref Range    Magnesium 2.8 (H) 1.8 - 2.6 mg/dL   INR   Result Value Ref Range    INR 2.58 (H) 0.90 - 1.10   Troponin I   Result Value Ref Range    Troponin I 0.02 0.00 - 0.29 ng/mL   HM1 (CBC with Diff)   Result Value Ref Range    WBC 43.0 (HH) 4.0 - 11.0 thou/uL    RBC 3.87 (L) 4.40 - 6.20 mill/uL    Hemoglobin 11.9 (L) 14.0 - 18.0 g/dL    Hematocrit 36.4 (L) 40.0 - 54.0 %    MCV 94 80 - 100 fL    MCH 30.7 27.0 - 34.0 pg    MCHC 32.7 32.0 - 36.0 g/dL    RDW 18.5 (H) 11.0 - 14.5 %    Platelets 312 140 - 440 thou/uL    MPV 11.4 8.5 - 12.5 fL   Manual Differential   Result Value Ref Range    Total Neutrophils % 91 (H) 50 - 70 %    Lymphocytes % 4 (L) 20 - 40 %    Monocytes % 2 2 - 10 %    Eosinophils %  1 0 - 6 %    Basophils % 0 0 - 2 %    Myelocytes % 2 (H) <=1 %    Total Neutrophils Absolute 39.1 (H) 2.0 - 7.7 thou/ul    Lymphocytes Absolute 1.7 0.8 - 4.4 thou/uL    Monocytes Absolute 0.9 0.0 - 0.9 thou/uL    Eosinophils Absolute 0.4 0.0 - 0.4 thou/uL    Basophils Absolute 0.0 0.0 - 0.2 thou/uL    Myelocytes Absolute 0.9 (H) <=0.1 thou/uL    Platelet Estimate Normal Normal   ECG 12 lead nursing unit performed   Result Value Ref Range    SYSTOLIC BLOOD PRESSURE  mmHg    DIASTOLIC BLOOD PRESSURE  mmHg    VENTRICULAR RATE 74 BPM    ATRIAL RATE 74 BPM    P-R INTERVAL 196 ms    QRS DURATION 102 ms    Q-T INTERVAL 378 ms    QTC CALCULATION (BEZET) 419 ms    P Axis 64 degrees    R AXIS 57 degrees    T AXIS 77 degrees    MUSE DIAGNOSIS       Normal sinus rhythm  Normal  ECG  No previous ECGs available     Lactic Acid   Result Value Ref Range    Lactic Acid 10.2 (HH) 0.5 - 2.2 mmol/L          Pertinent Radiology  Radiology Results: Personally reviewed image/s       XR Chest 1 View Portable [[id number]] Collected: 08/06/18 1830        Order Status: Completed Updated: 08/06/18 1900       Narrative:         XR CHEST 1 VIEW PORTABLE  8/6/2018 6:30 PM    INDICATION: Confusion  COMPARISON: None.    FINDINGS: Heart size at the upper limits of normal for technique. Lungs appear clear. No pleural effusion or pneumothorax.       CT Abdomen Pelvis Without Oral Without IV Contrast [[id number]] Collected: 08/06/18 1822       Order Status: Completed Updated: 08/06/18 1859       Narrative:         CT ABDOMEN PELVIS WO ORAL WO IV CONTRAST  8/6/2018 5:22 PM    INDICATION: Pain/bloating abdominal pain, left nephrostomy tubes  TECHNIQUE: Routine CT abdomen and pelvis without oral or IV contrast. Multiplanar reformation images (MPR). Dose reduction techniques were used.  COMPARISON: None.    FINDINGS:  LUNG BASES: Calcified pleural thickening right base.    ABDOMEN: Mild right hydronephrosis with small amount of air in the collecting system and mild dilatation of the right ureter to the bladder. Right nephrostomy tube is located outside of the collecting system with tip in the posterior cortex. Moderate   left hydronephrosis with the left ureter dilated to the bladder. Left nephrostomy tube tip is located in the subcutaneous fat left flank region. No calculi. The liver, spleen, pancreas, adrenal glands, and the gallbladder are unremarkable. There is   questionable gas/pneumatosis within the anterior gastric wall. The rest of the stomach is distended with fluid. No evidence for pneumoperitoneum. Numerous enlarged retroperitoneal lymph nodes, largest measuring 3.4 x 2.5 cm. Smaller enlarged retrocrural   nodes.    PELVIS: Mildly distended bladder with mild diffuse wall thickening. Bilateral pelvic sidewall  adenopathy, the largest node measuring 4.2 x 2.1 cm. There are also several enlarged bilateral inguinal lymph nodes. No evidence for bowel obstruction.   Prostatic hypertrophy. Scattered diverticula left colon.    MUSCULOSKELETAL: Degenerative disc disease thoracolumbar spine.         Impression:         CONCLUSION:  1.  Mild to moderate bilateral hydronephrosis. Both nephrostomy tubes are located external to the collecting systems and should be removed or repositioned.  2.  Dilatation of the ureters to the bladder where there is mild diffuse wall thickening. Study limited due to the lack of excreted contrast in the collecting systems and bladder. Urothelial tumor not excluded.  3.  Retroperitoneal and bilateral pelvic sidewall adenopathy concerning for lymphoma. Clinical correlation recommended.  4.  Questionable pneumatosis anterior gastric wall. Further characterization with repeat study after oral and IV contrast recommended.       CT Head Without Contrast [[id number]] Collected: 08/06/18 1827       Order Status: Completed Updated: 08/06/18 1841       Narrative:         [facility]  CT HEAD WO CONTRAST  8/6/2018 5:27 PM    INDICATION: Confusion, fall  TECHNIQUE: Without IV contrast. Dose reduction techniques were used.  CONTRAST: None  COMPARISON:  None    FINDINGS: No intracranial hemorrhage, extraaxial collection, mass effect or CT evidence of acute infarct.  Nonspecific punctate and confluent areas of decreased attenuation in the periventricular and subcortical white matter, presumed sequelae of   moderate chronic small vessel ischemic disease. Moderate cerebral parenchymal volume loss with concordant ex vacuo dilatation of the ventricular system. Prominent atherosclerotic calcifications in the cavernous internal carotid arteries. Osseous   structures are intact. The visualized orbits, paranasal sinuses and mastoid air cells are free of significant disease.            Impression:          CONCLUSION:  1.  No evidence of acute intracranial hemorrhage, mass effect, or acute infarction by CT.  2.  Moderate cerebral parenchymal volume loss with presumed sequelae of moderate chronic small vessel ischemic disease.           Fadi Ospina MD  [facility] Surgery Dept.             No